# Patient Record
Sex: FEMALE | Race: BLACK OR AFRICAN AMERICAN | NOT HISPANIC OR LATINO | Employment: FULL TIME | URBAN - NONMETROPOLITAN AREA
[De-identification: names, ages, dates, MRNs, and addresses within clinical notes are randomized per-mention and may not be internally consistent; named-entity substitution may affect disease eponyms.]

---

## 2017-11-10 ENCOUNTER — HOSPITAL ENCOUNTER (EMERGENCY)
Facility: HOSPITAL | Age: 27
Discharge: HOME | End: 2017-11-10
Attending: EMERGENCY MEDICINE | Admitting: EMERGENCY MEDICINE

## 2017-11-10 ENCOUNTER — APPOINTMENT (OUTPATIENT)
Dept: GENERAL RADIOLOGY | Facility: HOSPITAL | Age: 27
End: 2017-11-10

## 2017-11-10 VITALS
HEART RATE: 82 BPM | RESPIRATION RATE: 16 BRPM | TEMPERATURE: 97.6 F | WEIGHT: 130 LBS | OXYGEN SATURATION: 100 % | SYSTOLIC BLOOD PRESSURE: 122 MMHG | HEIGHT: 66 IN | BODY MASS INDEX: 20.89 KG/M2 | DIASTOLIC BLOOD PRESSURE: 89 MMHG

## 2017-11-10 DIAGNOSIS — R55 VASOVAGAL SYNCOPE: Primary | ICD-10-CM

## 2017-11-10 LAB
ALBUMIN SERPL-MCNC: 4.6 G/DL (ref 3.5–5)
ALBUMIN/GLOB SERPL: 1.3 G/DL (ref 1–2)
ALP SERPL-CCNC: 80 U/L (ref 38–126)
ALT SERPL W P-5'-P-CCNC: 108 U/L (ref 13–69)
AMPHET+METHAMPHET UR QL: NEGATIVE
AMPHETAMINES UR QL: NEGATIVE
ANION GAP SERPL CALCULATED.3IONS-SCNC: 17.9 MMOL/L
AST SERPL-CCNC: 45 U/L (ref 15–46)
B-HCG UR QL: NEGATIVE
BACTERIA UR QL AUTO: ABNORMAL /HPF
BARBITURATES UR QL SCN: NEGATIVE
BASOPHILS # BLD AUTO: 0.05 10*3/MM3 (ref 0–0.2)
BASOPHILS NFR BLD AUTO: 0.7 % (ref 0–2.5)
BENZODIAZ UR QL SCN: NEGATIVE
BILIRUB SERPL-MCNC: 0.3 MG/DL (ref 0.2–1.3)
BILIRUB UR QL STRIP: NEGATIVE
BUN BLD-MCNC: 12 MG/DL (ref 7–20)
BUN/CREAT SERPL: 17.1 (ref 7.1–23.5)
BUPRENORPHINE SERPL-MCNC: NEGATIVE NG/ML
CALCIUM SPEC-SCNC: 9.6 MG/DL (ref 8.4–10.2)
CANNABINOIDS SERPL QL: NEGATIVE
CHLORIDE SERPL-SCNC: 104 MMOL/L (ref 98–107)
CLARITY UR: CLEAR
CO2 SERPL-SCNC: 28 MMOL/L (ref 26–30)
COCAINE UR QL: NEGATIVE
COLOR UR: YELLOW
CREAT BLD-MCNC: 0.7 MG/DL (ref 0.6–1.3)
D-DIMER, QUANTITATIVE (MAD,POW, STR): 107 NG/ML (FEU) (ref 0–500)
DEPRECATED RDW RBC AUTO: 43.4 FL (ref 37–54)
EOSINOPHIL # BLD AUTO: 0.14 10*3/MM3 (ref 0–0.7)
EOSINOPHIL NFR BLD AUTO: 1.8 % (ref 0–7)
ERYTHROCYTE [DISTWIDTH] IN BLOOD BY AUTOMATED COUNT: 12.1 % (ref 11.5–14.5)
GFR SERPL CREATININE-BSD FRML MDRD: 121 ML/MIN/1.73
GLOBULIN UR ELPH-MCNC: 3.5 GM/DL
GLUCOSE BLD-MCNC: 125 MG/DL (ref 74–98)
GLUCOSE BLDC GLUCOMTR-MCNC: 90 MG/DL (ref 70–130)
GLUCOSE UR STRIP-MCNC: NEGATIVE MG/DL
GRAN CASTS URNS QL MICRO: ABNORMAL /LPF
HCT VFR BLD AUTO: 39.8 % (ref 37–47)
HGB BLD-MCNC: 12.6 G/DL (ref 12–16)
HGB UR QL STRIP.AUTO: NEGATIVE
HYALINE CASTS UR QL AUTO: ABNORMAL /LPF
IMM GRANULOCYTES # BLD: 0.01 10*3/MM3 (ref 0–0.06)
IMM GRANULOCYTES NFR BLD: 0.1 % (ref 0–0.6)
KETONES UR QL STRIP: NEGATIVE
LEUKOCYTE ESTERASE UR QL STRIP.AUTO: NEGATIVE
LYMPHOCYTES # BLD AUTO: 3.5 10*3/MM3 (ref 0.6–3.4)
LYMPHOCYTES NFR BLD AUTO: 45.7 % (ref 10–50)
MCH RBC QN AUTO: 30.7 PG (ref 27–31)
MCHC RBC AUTO-ENTMCNC: 31.7 G/DL (ref 30–37)
MCV RBC AUTO: 97.1 FL (ref 81–99)
METHADONE UR QL SCN: NEGATIVE
MONOCYTES # BLD AUTO: 0.78 10*3/MM3 (ref 0–0.9)
MONOCYTES NFR BLD AUTO: 10.2 % (ref 0–12)
MUCOUS THREADS URNS QL MICRO: ABNORMAL /HPF
NEUTROPHILS # BLD AUTO: 3.18 10*3/MM3 (ref 2–6.9)
NEUTROPHILS NFR BLD AUTO: 41.5 % (ref 37–80)
NITRITE UR QL STRIP: NEGATIVE
NRBC BLD MANUAL-RTO: 0 /100 WBC (ref 0–0)
OPIATES UR QL: NEGATIVE
OXYCODONE UR QL SCN: NEGATIVE
PCP UR QL SCN: NEGATIVE
PH UR STRIP.AUTO: 6.5 [PH] (ref 5–8)
PLATELET # BLD AUTO: 284 10*3/MM3 (ref 130–400)
PMV BLD AUTO: 11.5 FL (ref 6–12)
POTASSIUM BLD-SCNC: 3.9 MMOL/L (ref 3.5–5.1)
PROPOXYPH UR QL: NEGATIVE
PROT SERPL-MCNC: 8.1 G/DL (ref 6.3–8.2)
PROT UR QL STRIP: ABNORMAL
RBC # BLD AUTO: 4.1 10*6/MM3 (ref 4.2–5.4)
RBC # UR: ABNORMAL /HPF
REF LAB TEST METHOD: ABNORMAL
SODIUM BLD-SCNC: 146 MMOL/L (ref 137–145)
SP GR UR STRIP: 1.02 (ref 1–1.03)
SQUAMOUS #/AREA URNS HPF: ABNORMAL /HPF
TRANS CELLS #/AREA URNS HPF: ABNORMAL /HPF
TRICYCLICS UR QL SCN: NEGATIVE
TROPONIN I SERPL-MCNC: <0.012 NG/ML (ref 0–0.03)
UROBILINOGEN UR QL STRIP: ABNORMAL
WBC NRBC COR # BLD: 7.66 10*3/MM3 (ref 4.8–10.8)
WBC UR QL AUTO: ABNORMAL /HPF

## 2017-11-10 PROCEDURE — 71020 HC CHEST PA AND LATERAL: CPT

## 2017-11-10 PROCEDURE — 81025 URINE PREGNANCY TEST: CPT | Performed by: NURSE PRACTITIONER

## 2017-11-10 PROCEDURE — 85379 FIBRIN DEGRADATION QUANT: CPT | Performed by: NURSE PRACTITIONER

## 2017-11-10 PROCEDURE — 81001 URINALYSIS AUTO W/SCOPE: CPT | Performed by: NURSE PRACTITIONER

## 2017-11-10 PROCEDURE — 80306 DRUG TEST PRSMV INSTRMNT: CPT | Performed by: NURSE PRACTITIONER

## 2017-11-10 PROCEDURE — 99284 EMERGENCY DEPT VISIT MOD MDM: CPT

## 2017-11-10 PROCEDURE — 80053 COMPREHEN METABOLIC PANEL: CPT | Performed by: NURSE PRACTITIONER

## 2017-11-10 PROCEDURE — 96361 HYDRATE IV INFUSION ADD-ON: CPT

## 2017-11-10 PROCEDURE — 82962 GLUCOSE BLOOD TEST: CPT

## 2017-11-10 PROCEDURE — 87086 URINE CULTURE/COLONY COUNT: CPT | Performed by: NURSE PRACTITIONER

## 2017-11-10 PROCEDURE — 85025 COMPLETE CBC W/AUTO DIFF WBC: CPT | Performed by: NURSE PRACTITIONER

## 2017-11-10 PROCEDURE — 96360 HYDRATION IV INFUSION INIT: CPT

## 2017-11-10 PROCEDURE — 84484 ASSAY OF TROPONIN QUANT: CPT | Performed by: NURSE PRACTITIONER

## 2017-11-10 PROCEDURE — 93005 ELECTROCARDIOGRAM TRACING: CPT | Performed by: NURSE PRACTITIONER

## 2017-11-10 RX ADMIN — SODIUM CHLORIDE 1000 ML: 9 INJECTION, SOLUTION INTRAVENOUS at 19:55

## 2017-11-12 LAB — BACTERIA SPEC AEROBE CULT: NORMAL

## 2017-11-12 NOTE — ED PROVIDER NOTES
Subjective   History of Present Illness  27-year-old female who is currently incarcerated, was brought to the emergency department for concerns of an overdose.  The patient reports that she was sleeping and got up fast felt very faint and passed out.  She aroused easily.  She denies any chest pain, shortness of breath, PND or orthopnea.  She denies neck pain.  She states she has a mild headache.  He states she's been eating and drinking normally.  She denies any current drug usage.  Review of Systems   All other systems reviewed and are negative.      Past Medical History:   Diagnosis Date   • Ovarian cyst        No Known Allergies    Past Surgical History:   Procedure Laterality Date   •  SECTION         History reviewed. No pertinent family history.    Social History     Social History   • Marital status: Single     Spouse name: N/A   • Number of children: N/A   • Years of education: N/A     Social History Main Topics   • Smoking status: Current Every Day Smoker     Types: Electronic Cigarette, Cigarettes   • Smokeless tobacco: None   • Alcohol use No   • Drug use: Yes     Special: IV   • Sexual activity: Not Asked     Other Topics Concern   • None     Social History Narrative   • None           Objective   Physical Exam   Constitutional: She is oriented to person, place, and time. She appears well-developed and well-nourished.   HENT:   Head: Normocephalic and atraumatic.   Nose: Nose normal.   Mouth/Throat: Oropharynx is clear and moist.   TMs are pearly gray bilaterally.   Eyes: Conjunctivae and EOM are normal. Pupils are equal, round, and reactive to light.   Neck: Normal range of motion. Neck supple.   Cardiovascular: Normal rate, regular rhythm, normal heart sounds and intact distal pulses.  Exam reveals no gallop and no friction rub.    No murmur heard.  Pulmonary/Chest: Effort normal and breath sounds normal.   Abdominal: Soft. Bowel sounds are normal. There is no tenderness.   Musculoskeletal:  Normal range of motion. She exhibits no tenderness.   Neurological: She is alert and oriented to person, place, and time. She has normal reflexes. She displays normal reflexes. No cranial nerve deficit. She exhibits normal muscle tone. Coordination normal.   Skin: Skin is warm and dry.   Psychiatric: She has a normal mood and affect. Her behavior is normal. Judgment and thought content normal.   Nursing note and vitals reviewed.      Procedures         ED Course  ED Course   Comment By Time   EKG demonstrated normal sinus rhythm no acute changes.  Urine drug screen is negative.  UA is negative.  CMP and CBC are normal.  Prominent and d-dimer are both negative.  Chest x-ray was negative.  CT of the head was negative.  We gave her normal saline 1 L here in the ER and she states that she feels fine.  She will be discharged back to the nursing home and if symptoms worsen or return they will bring her back to the emergency department. Sushma Fajardo, GABI 11/12 2625                  [facility]    Final diagnoses:   Vasovagal syncope            Sushma Fajardo, GABI  11/12/17 [id number]

## 2021-07-22 ENCOUNTER — TRANSCRIBE ORDERS (OUTPATIENT)
Dept: ADMINISTRATIVE | Facility: HOSPITAL | Age: 31
End: 2021-07-22

## 2021-07-22 DIAGNOSIS — B17.10 ACUTE HEPATITIS C WITHOUT HEPATIC COMA: ICD-10-CM

## 2021-07-22 DIAGNOSIS — B00.9 HERPES SIMPLEX VIRAL INFECTION: Primary | ICD-10-CM

## 2021-08-10 ENCOUNTER — HOSPITAL ENCOUNTER (OUTPATIENT)
Dept: ULTRASOUND IMAGING | Facility: HOSPITAL | Age: 31
Discharge: HOME | End: 2021-08-10
Admitting: NURSE PRACTITIONER

## 2021-08-10 ENCOUNTER — OFFICE VISIT (OUTPATIENT)
Dept: OBSTETRICS AND GYNECOLOGY | Facility: CLINIC | Age: 31
End: 2021-08-10

## 2021-08-10 VITALS
BODY MASS INDEX: 21.53 KG/M2 | HEIGHT: 66 IN | SYSTOLIC BLOOD PRESSURE: 120 MMHG | DIASTOLIC BLOOD PRESSURE: 72 MMHG | WEIGHT: 134 LBS

## 2021-08-10 DIAGNOSIS — Z12.4 SCREENING FOR CERVICAL CANCER: ICD-10-CM

## 2021-08-10 DIAGNOSIS — B00.9 HERPES SIMPLEX VIRAL INFECTION: ICD-10-CM

## 2021-08-10 DIAGNOSIS — Z01.419 ENCOUNTER FOR GYNECOLOGICAL EXAMINATION WITHOUT ABNORMAL FINDING: Primary | ICD-10-CM

## 2021-08-10 DIAGNOSIS — Z11.3 ROUTINE SCREENING FOR STI (SEXUALLY TRANSMITTED INFECTION): ICD-10-CM

## 2021-08-10 DIAGNOSIS — B17.10 ACUTE HEPATITIS C WITHOUT HEPATIC COMA: ICD-10-CM

## 2021-08-10 PROCEDURE — 76705 ECHO EXAM OF ABDOMEN: CPT

## 2021-08-10 PROCEDURE — 99385 PREV VISIT NEW AGE 18-39: CPT | Performed by: PHYSICIAN ASSISTANT

## 2021-08-10 NOTE — PROGRESS NOTES
"Subjective   Chief Complaint   Patient presents with   • Gynecologic Exam     Annual and pap. Last pap 2019 WNL. Patient states she is doing well       Juvencio Ramirez is a 31 y.o. year old  presenting to be seen for her annual gynecological exam.   Has no complaints or concerns  Using condoms for birth control  Would like STI screening with pap but no symptoms of STI  Cycles regular q 28 days and LMP 2 weeks ago    Past Medical History:   Diagnosis Date   • Hepatitis C    • Herpes    • Infectious viral hepatitis    • Ovarian cyst       No current outpatient medications on file.   No Known Allergies   Past Surgical History:   Procedure Laterality Date   •  SECTION        Social History     Socioeconomic History   • Marital status: Single     Spouse name: Not on file   • Number of children: Not on file   • Years of education: Not on file   • Highest education level: Not on file   Tobacco Use   • Smoking status: Current Every Day Smoker     Types: Electronic Cigarette, Cigarettes   • Smokeless tobacco: Never Used   Substance and Sexual Activity   • Alcohol use: No   • Drug use: Yes     Types: IV   • Sexual activity: Yes     Partners: Male      Family History   Problem Relation Age of Onset   • Diabetes Mother        Review of Systems   Constitutional: Negative for chills, diaphoresis and fever.   Gastrointestinal: Positive for constipation. Negative for abdominal pain.   Genitourinary: Negative for difficulty urinating, dysuria, menstrual problem, pelvic pain and vaginal discharge.   All other systems reviewed and are negative.          Objective   /72   Ht 167.6 cm (66\")   Wt 60.8 kg (134 lb)   LMP 2021   Breastfeeding No   BMI 21.63 kg/m²     Physical Exam  Constitutional:       Appearance: Normal appearance. She is well-developed and well-groomed.   Eyes:      General: Lids are normal.      Extraocular Movements: Extraocular movements intact.      Conjunctiva/sclera: Conjunctivae " normal.   Neck:      Thyroid: No thyroid mass or thyromegaly.   Chest:      Breasts: Breasts are symmetrical.         Right: No inverted nipple, mass, nipple discharge, skin change or tenderness.         Left: No inverted nipple, mass, nipple discharge, skin change or tenderness.   Abdominal:      General: There is no distension.      Palpations: Abdomen is soft. There is no hepatomegaly or splenomegaly.      Tenderness: There is no abdominal tenderness.   Genitourinary:     Exam position: Lithotomy position.      Labia:         Right: No rash, tenderness or lesion.         Left: No rash, tenderness or lesion.       Urethra: No prolapse, urethral pain, urethral swelling or urethral lesion.      Vagina: No vaginal discharge, tenderness or lesions.      Cervix: No cervical motion tenderness, discharge, friability or lesion.      Uterus: Not enlarged and not tender.       Adnexa:         Right: No mass or tenderness.          Left: No mass or tenderness.     Musculoskeletal:      Cervical back: Neck supple.   Lymphadenopathy:      Upper Body:      Right upper body: No axillary adenopathy.      Left upper body: No axillary adenopathy.   Skin:     General: Skin is warm and dry.      Findings: No lesion.   Neurological:      General: No focal deficit present.      Mental Status: She is alert and oriented to person, place, and time.   Psychiatric:         Attention and Perception: Attention normal.         Mood and Affect: Mood normal.         Speech: Speech normal.         Behavior: Behavior normal.         Thought Content: Thought content normal.            Result Review :                   Assessment and Plan  Diagnoses and all orders for this visit:    1. Encounter for gynecological examination without abnormal finding (Primary)    2. Screening for cervical cancer  -     Pap IG, Ct-Ng, HPV-hr; Future    3. Routine screening for STI (sexually transmitted infection)  -     Pap IG, Ct-Ng, HPV-hr; Future      Patient  Instructions   Self breast exam monthly  Regular exercise                This note was electronically signed.    Velma Allison PA-C   August 10, 2021

## 2021-08-20 ENCOUNTER — DISEASE STATE MANAGEMENT VISIT (OUTPATIENT)
Dept: PHARMACY | Facility: HOSPITAL | Age: 31
End: 2021-08-20

## 2021-08-20 ENCOUNTER — LAB (OUTPATIENT)
Dept: LAB | Facility: HOSPITAL | Age: 31
End: 2021-08-20

## 2021-08-20 VITALS
SYSTOLIC BLOOD PRESSURE: 124 MMHG | OXYGEN SATURATION: 96 % | HEART RATE: 102 BPM | TEMPERATURE: 98.2 F | DIASTOLIC BLOOD PRESSURE: 67 MMHG

## 2021-08-20 DIAGNOSIS — Z11.3 ROUTINE SCREENING FOR STI (SEXUALLY TRANSMITTED INFECTION): ICD-10-CM

## 2021-08-20 DIAGNOSIS — Z12.4 SCREENING FOR CERVICAL CANCER: ICD-10-CM

## 2021-08-20 DIAGNOSIS — F19.91 HISTORY OF ILLICIT DRUG USE: ICD-10-CM

## 2021-08-20 DIAGNOSIS — B18.2 CHRONIC HEPATITIS C WITHOUT HEPATIC COMA (HCC): Primary | ICD-10-CM

## 2021-08-20 DIAGNOSIS — B18.2 CHRONIC HEPATITIS C WITHOUT HEPATIC COMA (HCC): ICD-10-CM

## 2021-08-20 LAB
ALBUMIN SERPL-MCNC: 4.3 G/DL (ref 3.5–5.2)
ALBUMIN/GLOB SERPL: 1.3 G/DL
ALP SERPL-CCNC: 56 U/L (ref 39–117)
ALT SERPL W P-5'-P-CCNC: 24 U/L (ref 1–33)
AMPHET+METHAMPHET UR QL: NEGATIVE
AMPHETAMINES UR QL: NEGATIVE
ANION GAP SERPL CALCULATED.3IONS-SCNC: 8.9 MMOL/L (ref 5–15)
AST SERPL-CCNC: 19 U/L (ref 1–32)
BARBITURATES UR QL SCN: NEGATIVE
BENZODIAZ UR QL SCN: NEGATIVE
BILIRUB SERPL-MCNC: 0.4 MG/DL (ref 0–1.2)
BUN SERPL-MCNC: 12 MG/DL (ref 6–20)
BUN/CREAT SERPL: 18.5 (ref 7–25)
BUPRENORPHINE SERPL-MCNC: NEGATIVE NG/ML
CALCIUM SPEC-SCNC: 9.3 MG/DL (ref 8.6–10.5)
CANNABINOIDS SERPL QL: NEGATIVE
CHLORIDE SERPL-SCNC: 107 MMOL/L (ref 98–107)
CO2 SERPL-SCNC: 25.1 MMOL/L (ref 22–29)
COCAINE UR QL: NEGATIVE
CREAT SERPL-MCNC: 0.65 MG/DL (ref 0.57–1)
GFR SERPL CREATININE-BSD FRML MDRD: 128 ML/MIN/1.73
GLOBULIN UR ELPH-MCNC: 3.3 GM/DL
GLUCOSE SERPL-MCNC: 84 MG/DL (ref 65–99)
HBV SURFACE AB SER RIA-ACNC: REACTIVE
HCG SERPL QL: NEGATIVE
INR PPP: 0.98 (ref 0.9–1.1)
METHADONE UR QL SCN: NEGATIVE
OPIATES UR QL: NEGATIVE
OXYCODONE UR QL SCN: NEGATIVE
PCP UR QL SCN: NEGATIVE
POTASSIUM SERPL-SCNC: 4 MMOL/L (ref 3.5–5.2)
PROPOXYPH UR QL: NEGATIVE
PROT SERPL-MCNC: 7.6 G/DL (ref 6–8.5)
PROTHROMBIN TIME: 13.5 SECONDS (ref 12–15.1)
SODIUM SERPL-SCNC: 141 MMOL/L (ref 136–145)
TRICYCLICS UR QL SCN: NEGATIVE

## 2021-08-20 PROCEDURE — 86704 HEP B CORE ANTIBODY TOTAL: CPT

## 2021-08-20 PROCEDURE — 36415 COLL VENOUS BLD VENIPUNCTURE: CPT

## 2021-08-20 PROCEDURE — 87902 NFCT AGT GNTYP ALYS HEP C: CPT

## 2021-08-20 PROCEDURE — 85610 PROTHROMBIN TIME: CPT

## 2021-08-20 PROCEDURE — 81596 NFCT DS CHRNC HCV 6 ASSAYS: CPT

## 2021-08-20 PROCEDURE — 87522 HEPATITIS C REVRS TRNSCRPJ: CPT

## 2021-08-20 PROCEDURE — 80053 COMPREHEN METABOLIC PANEL: CPT

## 2021-08-20 PROCEDURE — 99203 OFFICE O/P NEW LOW 30 MIN: CPT | Performed by: PHYSICIAN ASSISTANT

## 2021-08-20 PROCEDURE — 86708 HEPATITIS A ANTIBODY: CPT

## 2021-08-20 PROCEDURE — 84703 CHORIONIC GONADOTROPIN ASSAY: CPT

## 2021-08-20 PROCEDURE — 80306 DRUG TEST PRSMV INSTRMNT: CPT

## 2021-08-20 PROCEDURE — 86706 HEP B SURFACE ANTIBODY: CPT

## 2021-08-20 RX ORDER — NORETHINDRONE AND ETHINYL ESTRADIOL 1 MG-35MCG
1 KIT ORAL DAILY
COMMUNITY
End: 2021-10-08

## 2021-08-20 RX ORDER — LEVOCETIRIZINE DIHYDROCHLORIDE 5 MG/1
5 TABLET, FILM COATED ORAL DAILY
COMMUNITY
Start: 2021-08-19 | End: 2021-10-08

## 2021-08-20 NOTE — PROGRESS NOTES
New Patient Consult      Date: 2021   Patient Name: Juvencio Ramirez  MRN: [id number]  : 1990     Primary Care Provider: Teresa Madrid APRN  Referring Provider: Brando    Chief Complaint   Patient presents with   • Hepatitis C     Pt here for initial Hep C visit     History of Present Illness: Juvencio Ramirez is a 31 y.o. female who is here today as a consultation with Gastroenterology for evaluation of Hepatitis C.    She found out about having Hepatitis C infection approx 2 years ago. She has not had prior treatment for hepatitis. She found out that she had it during incarceration. Reports no known personal history of liver disease including other viral hepatitis. There is no known family history of liver disease or cirrhosis. She admits to previous IVDU and intranasal drug use. She has been clean now for 5 years. She does not have nonprofessional tattoos. Denies having previous alcoholism. She does currently drink alcohol socially in small amounts. She denies  current illicit drug use including marijuana. She did have recent liver imaging. She has had recent labs. She has had previous vaccinations for Hepatitis A (she thinks).     Subjective      Past Medical History:   Diagnosis Date   • Hepatitis C    • Herpes    • Infectious viral hepatitis    • Ovarian cyst      Past Surgical History:   Procedure Laterality Date   •  SECTION       Family History   Problem Relation Age of Onset   • Diabetes Mother    • Thyroid disease Mother      Social History     Socioeconomic History   • Marital status: Single     Spouse name: Not on file   • Number of children: Not on file   • Years of education: Not on file   • Highest education level: Not on file   Tobacco Use   • Smoking status: Current Every Day Smoker     Packs/day: 0.50     Types: Cigarettes   • Smokeless tobacco: Never Used   Vaping Use   • Vaping Use: Former   Substance and Sexual Activity   • Alcohol use: No   • Drug use: Not  Currently     Types: IV   • Sexual activity: Yes     Partners: Male     Current Outpatient Medications:   •  levocetirizine (XYZAL) 5 MG tablet, Take 5 mg by mouth Daily., Disp: , Rfl:   •  norethindrone-ethinyl estradiol (Alyacen 1/35) 1-35 MG-MCG per tablet, Take 1 tablet by mouth Daily., Disp: , Rfl:   •  VITAMIN D PO, Take 1 tablet by mouth Daily., Disp: , Rfl:     No Known Allergies    The following portions of the patient's history were reviewed and updated as appropriate: allergies, current medications, past family history, past medical history, past social history, past surgical history and problem list.    Objective     Physical Exam  Vitals reviewed.   Constitutional:       General: She is not in acute distress.     Appearance: Normal appearance. She is well-developed. She is not ill-appearing or diaphoretic.   HENT:      Head: Normocephalic and atraumatic.      Right Ear: External ear normal.      Left Ear: External ear normal.      Nose: Nose normal.      Mouth/Throat:      Comments: Wearing a mask  Eyes:      General: No scleral icterus.        Right eye: No discharge.         Left eye: No discharge.      Conjunctiva/sclera: Conjunctivae normal.   Neck:      Vascular: No JVD.   Cardiovascular:      Rate and Rhythm: Normal rate and regular rhythm.      Heart sounds: Normal heart sounds. No murmur heard.   No friction rub. No gallop.    Pulmonary:      Effort: Pulmonary effort is normal. No respiratory distress.      Breath sounds: Normal breath sounds. No wheezing or rales.   Chest:      Chest wall: No tenderness.   Abdominal:      General: Bowel sounds are normal. There is no distension.      Palpations: Abdomen is soft. There is no mass.      Tenderness: There is no abdominal tenderness. There is no guarding.   Musculoskeletal:         General: No deformity. Normal range of motion.      Cervical back: Normal range of motion.   Skin:     General: Skin is warm and dry.      Findings: No erythema or rash.       Comments: tattophylicia   Neurological:      Mental Status: She is alert and oriented to person, place, and time.      Coordination: Coordination normal.   Psychiatric:         Mood and Affect: Mood normal.         Behavior: Behavior normal.         Thought Content: Thought content normal.         Judgment: Judgment normal.       Vitals:    08/20/21 1100   BP: 124/67   Pulse: 102   Temp: 98.2 °F (36.8 °C)   SpO2: 96%     Results Review:   I have reviewed the patient's new clinical and imaging results.    Labs 7/10/2021: HIV negative, AST 20, ALT 29, alkaline phosphatase 59, total bilirubin 0.4, hepatitis B surface antigen negative, hepatitis B core antibody IgM negative, hepatitis C antibody positive, HCVRNA 36,200.     US Liver  Result Date: 8/10/2021  Normal liver ultrasound.       Assessment / Plan      1. Chronic hepatitis C without hepatic coma   08/20/21   She has chronic hepatitis C, genotype unknown, treatment naïve, without suspected cirrhosis.  She will need labs prior to treatment including repeat CMP, pregnancy screening, fibrosis staging, repeat HCVRNA quant, labs to determine immunity hepatitis A and B, hepatitis B core total antibody, INR.    More recommendations will be made after these results have been reviewed. She will continue to abstain from alcohol and illegal drugs. She will have US liver to determine if any lesions or other liver diseases present. Immunity to Hep A and B will be determined and vaccinations recommended if needed. After review of these results and discussion with with the patient, we will proceed with Hep C therapy and will submit Rx to insurance company for approval. Treatment will depend on fibrosis score and Hep C genotype. When approved, she will  Rx from our pharmacy and start taking exactly as directed. Hep C viral load lab (HCV RNA quant) and CMP will be checked 4 weeks after start of therapy, at end of therapy and 3 months s/p therapy to determine response to  treatment and cure. She will call with concerns.     - Comprehensive Metabolic Panel; Future  - hCG, Serum, Qualitative; Future  - HCV FibroSURE; Future  - HCV RNA By PCR, Qn Rfx Susan; Future  - Hepatitis A Antibody, Total; Future  - Hepatitis B Core Antibody, Total; Future  - Hepatitis B Surface Antibody; Future  - Protime-INR; Future    2. History of illicit drug use  08/20/21  She has a prior history of IV drug use, has been clean for 5 years.  Will complete urine drug screen today prior to starting any treatment.  She understands that even after hepatitis C treatment, she is not immune to hepatitis C and can contract again if she continues any risky behaviors.  - Urine Drug Screen - Urine, Clean Catch; Future          Follow Up:   She will follow up after 4 weeks of taking hepatitis C medication.  She will be called with those results.  Her work schedule won't allow for her to follow-up in 2 weeks.      Jillian Robison PA-C  [facility] [place]  8/20/2021  14:37 EDT    Please note that portions of this note may have been completed with a voice recognition program. Efforts were made to edit the dictations, but occasionally words are mistranscribed.

## 2021-08-21 LAB
HAV AB SER QL IA: POSITIVE
HBV CORE AB SERPL QL IA: NEGATIVE

## 2021-08-22 LAB
HCV GENTYP SERPL NAA+PROBE: NORMAL
HCV GENTYP SERPL NAA+PROBE: NORMAL
HCV RNA SERPL NAA+PROBE-ACNC: NORMAL IU/ML
HCV RNA SERPL NAA+PROBE-LOG IU: 4.78 LOG10 IU/ML
LABORATORY COMMENT REPORT: NORMAL
REF LAB TEST REF RANGE: NORMAL

## 2021-08-24 LAB
A2 MACROGLOB SERPL-MCNC: 170 MG/DL (ref 110–276)
ALT SERPL W P-5'-P-CCNC: 22 IU/L (ref 0–40)
APO A-I SERPL-MCNC: 154 MG/DL (ref 116–209)
BILIRUB SERPL-MCNC: 0.4 MG/DL (ref 0–1.2)
FIBROSIS SCORING:: NORMAL
FIBROSIS STAGE SERPL QL: NORMAL
GGT SERPL-CCNC: 12 IU/L (ref 0–60)
HAPTOGLOB SERPL-MCNC: 81 MG/DL (ref 33–278)
HCV AB SER QL: NORMAL
LABORATORY COMMENT REPORT: NORMAL
LIVER FIBR SCORE SERPL CALC.FIBROSURE: 0.06 (ref 0–0.21)
NECROINFLAMM ACTIVITY SCORING:: NORMAL
NECROINFLAMMATORY ACT GRADE SERPL QL: NORMAL
NECROINFLAMMATORY ACT SCORE SERPL: 0.07 (ref 0–0.17)
SERVICE CMNT-IMP: NORMAL

## 2021-08-26 ENCOUNTER — TELEPHONE (OUTPATIENT)
Dept: GASTROENTEROLOGY | Facility: CLINIC | Age: 31
End: 2021-08-26

## 2021-08-26 DIAGNOSIS — B18.2 CHRONIC HEPATITIS C WITHOUT HEPATIC COMA (HCC): Primary | ICD-10-CM

## 2021-08-26 RX ORDER — VELPATASVIR AND SOFOSBUVIR 100; 400 MG/1; MG/1
1 TABLET, FILM COATED ORAL DAILY
Qty: 28 TABLET | Refills: 2
Start: 2021-08-26 | End: 2021-08-27

## 2021-08-26 RX ORDER — GLECAPREVIR AND PIBRENTASVIR 40; 100 MG/1; MG/1
3 TABLET, FILM COATED ORAL DAILY
Qty: 84 TABLET | Refills: 1
Start: 2021-08-26 | End: 2021-08-26

## 2021-08-26 NOTE — TELEPHONE ENCOUNTER
She has chronic Hepatitis C infection, genotype 1a, treatment naive, without cirrhosis (F0). I have prescribed Epclusa for 12 weeks. She will not need to change her birth control with this therapy.

## 2021-08-27 ENCOUNTER — DISEASE STATE MANAGEMENT VISIT (OUTPATIENT)
Dept: PHARMACY | Facility: HOSPITAL | Age: 31
End: 2021-08-27

## 2021-08-27 RX ORDER — VELPATASVIR AND SOFOSBUVIR 100; 400 MG/1; MG/1
1 TABLET, FILM COATED ORAL DAILY
Qty: 28 TABLET | Refills: 2 | Status: SHIPPED | OUTPATIENT
Start: 2021-08-27 | End: 2021-10-01

## 2021-08-27 NOTE — PROGRESS NOTES
Juvencio Ramirez is a 31 y.o. female [facility] for Hepatitis C treatment.      Past Medical History:   Diagnosis Date   • Hepatitis C    • Herpes    • Infectious viral hepatitis    • Ovarian cyst      Social History     Socioeconomic History   • Marital status: Single     Spouse name: Not on file   • Number of children: Not on file   • Years of education: Not on file   • Highest education level: Not on file   Tobacco Use   • Smoking status: Current Every Day Smoker     Packs/day: 0.50     Types: Cigarettes   • Smokeless tobacco: Never Used   Vaping Use   • Vaping Use: Former   Substance and Sexual Activity   • Alcohol use: No   • Drug use: Not Currently     Types: IV   • Sexual activity: Yes     Partners: Male     Patient has no known allergies.    Current Outpatient Medications:   •  levocetirizine (XYZAL) 5 MG tablet, Take 5 mg by mouth Daily., Disp: , Rfl:   •  norethindrone-ethinyl estradiol (Alyacen 1/35) 1-35 MG-MCG per tablet, Take 1 tablet by mouth Daily., Disp: , Rfl:   •  Sofosbuvir-Velpatasvir 400-100 MG tablet, Take 1 tablet by mouth Daily., Disp: 28 tablet, Rfl: 2  •  VITAMIN D PO, Take 1 tablet by mouth Daily., Disp: , Rfl:     Labs     WBC   Date Value Ref Range Status   11/10/2017 7.66 4.80 - 10.80 10*3/mm3 Final     RBC   Date Value Ref Range Status   11/10/2017 4.10 (L) 4.20 - 5.40 10*6/mm3 Final     Hemoglobin   Date Value Ref Range Status   11/10/2017 12.6 12.0 - 16.0 g/dL Final     Hematocrit   Date Value Ref Range Status   11/10/2017 39.8 37.0 - 47.0 % Final     MCV   Date Value Ref Range Status   11/10/2017 97.1 81.0 - 99.0 fL Final     MCH   Date Value Ref Range Status   11/10/2017 30.7 27.0 - 31.0 pg Final     MCHC   Date Value Ref Range Status   11/10/2017 31.7 30.0 - 37.0 g/dL Final     RDW   Date Value Ref Range Status   11/10/2017 12.1 11.5 - 14.5 % Final     RDW-SD   Date Value Ref Range Status   11/10/2017 43.4 37.0 - 54.0 fl Final     MPV   Date Value  Ref Range Status   11/10/2017 11.5 6.0 - 12.0 fL Final     Platelets   Date Value Ref Range Status   11/10/2017 284 130 - 400 10*3/mm3 Final     Neutrophil %   Date Value Ref Range Status   11/10/2017 41.5 37.0 - 80.0 % Final     Lymphocyte %   Date Value Ref Range Status   11/10/2017 45.7 10.0 - 50.0 % Final     Monocyte %   Date Value Ref Range Status   11/10/2017 10.2 0.0 - 12.0 % Final     Eosinophil %   Date Value Ref Range Status   11/10/2017 1.8 0.0 - 7.0 % Final     Basophil %   Date Value Ref Range Status   11/10/2017 0.7 0.0 - 2.5 % Final     Immature Grans %   Date Value Ref Range Status   11/10/2017 0.1 0.0 - 0.6 % Final     Neutrophils, Absolute   Date Value Ref Range Status   11/10/2017 3.18 2.00 - 6.90 10*3/mm3 Final     Lymphocytes, Absolute   Date Value Ref Range Status   11/10/2017 3.50 (H) 0.60 - 3.40 10*3/mm3 Final     Monocytes, Absolute   Date Value Ref Range Status   11/10/2017 0.78 0.00 - 0.90 10*3/mm3 Final     Eosinophils, Absolute   Date Value Ref Range Status   11/10/2017 0.14 0.00 - 0.70 10*3/mm3 Final     Basophils, Absolute   Date Value Ref Range Status   11/10/2017 0.05 0.00 - 0.20 10*3/mm3 Final     Immature Grans, Absolute   Date Value Ref Range Status   11/10/2017 0.01 0.00 - 0.06 10*3/mm3 Final     nRBC   Date Value Ref Range Status   11/10/2017 0.0 0.0 - 0.0 /100 WBC Final       Lab Results   Component Value Date    WBC 7.66 11/10/2017    HGB 12.6 11/10/2017    HCT 39.8 11/10/2017    MCV 97.1 11/10/2017     11/10/2017     Lab Results   Component Value Date    HAV Positive (A) 08/20/2021    HEPBCAB Negative 08/20/2021       No results found for: HCVRNA   Hepatitis C Genotype   Date Value Ref Range Status   08/20/2021 1a  Final     HCV Genotype   Date Value Ref Range Status   08/20/2021 Comment  Final     Comment:     To be performed on this specimen.       Drug Interactions Screening     A drug-drug interactions check was made. No interactions expected according to  literature.     Assessment & Plan     Patient has Hepatitis C, genotype 1a, without cirrhosis (F0) and is treatment naïve.  Patient has been prescribed Epclusa 1 tablet daily x 12 weeks.  Will begin prior authorization, if applicable and provide medication counseling to patient once drug is approved and ready to dispense.      Pt will follow up with clinic 4 weeks after starting medication to assess virologic response and medication tolerability.     Payal Mccrary RPH  08/27/21  15:24 EDT

## 2021-08-30 ENCOUNTER — PRIOR AUTHORIZATION (OUTPATIENT)
Dept: PHARMACY | Facility: HOSPITAL | Age: 31
End: 2021-08-30

## 2021-09-01 ENCOUNTER — TELEPHONE (OUTPATIENT)
Dept: PHARMACY | Facility: HOSPITAL | Age: 31
End: 2021-09-01

## 2021-09-09 ENCOUNTER — LAB (OUTPATIENT)
Dept: LAB | Facility: HOSPITAL | Age: 31
End: 2021-09-09

## 2021-09-09 ENCOUNTER — TELEPHONE (OUTPATIENT)
Dept: GASTROENTEROLOGY | Facility: CLINIC | Age: 31
End: 2021-09-09

## 2021-09-09 DIAGNOSIS — R10.11 RUQ ABDOMINAL PAIN: Primary | ICD-10-CM

## 2021-09-09 DIAGNOSIS — R10.11 RUQ ABDOMINAL PAIN: ICD-10-CM

## 2021-09-09 LAB
ALBUMIN SERPL-MCNC: 4.4 G/DL (ref 3.5–5.2)
ALBUMIN/GLOB SERPL: 1.4 G/DL
ALP SERPL-CCNC: 57 U/L (ref 39–117)
ALT SERPL W P-5'-P-CCNC: 12 U/L (ref 1–33)
ANION GAP SERPL CALCULATED.3IONS-SCNC: 12.7 MMOL/L (ref 5–15)
AST SERPL-CCNC: 14 U/L (ref 1–32)
BASOPHILS # BLD AUTO: 0.05 10*3/MM3 (ref 0–0.2)
BASOPHILS NFR BLD AUTO: 0.7 % (ref 0–1.5)
BILIRUB SERPL-MCNC: 0.3 MG/DL (ref 0–1.2)
BUN SERPL-MCNC: 11 MG/DL (ref 6–20)
BUN/CREAT SERPL: 34.4 (ref 7–25)
CALCIUM SPEC-SCNC: 9.5 MG/DL (ref 8.6–10.5)
CHLORIDE SERPL-SCNC: 104 MMOL/L (ref 98–107)
CO2 SERPL-SCNC: 23.3 MMOL/L (ref 22–29)
CREAT SERPL-MCNC: 0.32 MG/DL (ref 0.57–1)
DEPRECATED RDW RBC AUTO: 43.9 FL (ref 37–54)
EOSINOPHIL # BLD AUTO: 0.13 10*3/MM3 (ref 0–0.4)
EOSINOPHIL NFR BLD AUTO: 1.8 % (ref 0.3–6.2)
ERYTHROCYTE [DISTWIDTH] IN BLOOD BY AUTOMATED COUNT: 11.9 % (ref 12.3–15.4)
GFR SERPL CREATININE-BSD FRML MDRD: >150 ML/MIN/1.73
GLOBULIN UR ELPH-MCNC: 3.2 GM/DL
GLUCOSE SERPL-MCNC: 91 MG/DL (ref 65–99)
HCT VFR BLD AUTO: 41.1 % (ref 34–46.6)
HGB BLD-MCNC: 13.5 G/DL (ref 12–15.9)
IMM GRANULOCYTES # BLD AUTO: 0.02 10*3/MM3 (ref 0–0.05)
IMM GRANULOCYTES NFR BLD AUTO: 0.3 % (ref 0–0.5)
LYMPHOCYTES # BLD AUTO: 2.4 10*3/MM3 (ref 0.7–3.1)
LYMPHOCYTES NFR BLD AUTO: 33.1 % (ref 19.6–45.3)
MCH RBC QN AUTO: 32.7 PG (ref 26.6–33)
MCHC RBC AUTO-ENTMCNC: 32.8 G/DL (ref 31.5–35.7)
MCV RBC AUTO: 99.5 FL (ref 79–97)
MONOCYTES # BLD AUTO: 0.67 10*3/MM3 (ref 0.1–0.9)
MONOCYTES NFR BLD AUTO: 9.2 % (ref 5–12)
NEUTROPHILS NFR BLD AUTO: 3.98 10*3/MM3 (ref 1.7–7)
NEUTROPHILS NFR BLD AUTO: 54.9 % (ref 42.7–76)
NRBC BLD AUTO-RTO: 0 /100 WBC (ref 0–0.2)
PLATELET # BLD AUTO: 293 10*3/MM3 (ref 140–450)
PMV BLD AUTO: 12.5 FL (ref 6–12)
POTASSIUM SERPL-SCNC: 3.7 MMOL/L (ref 3.5–5.2)
PROT SERPL-MCNC: 7.6 G/DL (ref 6–8.5)
RBC # BLD AUTO: 4.13 10*6/MM3 (ref 3.77–5.28)
SODIUM SERPL-SCNC: 140 MMOL/L (ref 136–145)
WBC # BLD AUTO: 7.25 10*3/MM3 (ref 3.4–10.8)

## 2021-09-09 PROCEDURE — 36415 COLL VENOUS BLD VENIPUNCTURE: CPT

## 2021-09-09 PROCEDURE — 85025 COMPLETE CBC W/AUTO DIFF WBC: CPT

## 2021-09-09 PROCEDURE — 80053 COMPREHEN METABOLIC PANEL: CPT

## 2021-09-09 NOTE — TELEPHONE ENCOUNTER
Pancho Macedo MA Chasteen, Sara, PA-C  Pt does have some constipation, last BM was 2 days ago. Pt states her BMs are normally pretty regular.   Pt is going to have labs drawn now.           Previous Messages       ----- Message -----   From: Jillian Robison PA-C   Sent: 9/9/2021  12:39 PM EDT   To: Pancho Macedo MA     I have ordered labs for her if she will complete those, report to ED with any severe or worsening abdominal pain. Please ask her about bowel movements, does she have constipation?   ----- Message -----   From: Pancho Macedo MA   Sent: 9/9/2021  12:30 PM EDT   To: Jillian Robison PA-C     Pt called this morning complaining of having some right sided abdominal pain, she said it felt swollen and she has had a stomach ache. Pt started on Epclusa on 9/1.     Pt does still have her appendix and has been advised to contact her primary care doctor as well. Is there anything else she needs to do. She asked about getting her liver enzymes checked to rule that out.     Please advise.

## 2021-09-10 NOTE — TELEPHONE ENCOUNTER
CBC and CMP normal. Please let her know. Recommend she take miralax 17 g once daily for treatment of constipation.

## 2021-09-10 NOTE — TELEPHONE ENCOUNTER
Spoke with patient and gave lab results, advised pt to take Miralax 17g daily for constipation.     Pt verbalized understanding

## 2021-09-19 ENCOUNTER — HOSPITAL ENCOUNTER (EMERGENCY)
Facility: HOSPITAL | Age: 31
Discharge: HOME | End: 2021-09-19
Attending: EMERGENCY MEDICINE | Admitting: EMERGENCY MEDICINE

## 2021-09-19 VITALS
HEIGHT: 66 IN | HEART RATE: 112 BPM | DIASTOLIC BLOOD PRESSURE: 87 MMHG | RESPIRATION RATE: 20 BRPM | SYSTOLIC BLOOD PRESSURE: 133 MMHG | OXYGEN SATURATION: 100 % | BODY MASS INDEX: 20.89 KG/M2 | WEIGHT: 130 LBS | TEMPERATURE: 98.2 F

## 2021-09-19 DIAGNOSIS — J06.9 UPPER RESPIRATORY TRACT INFECTION, UNSPECIFIED TYPE: Primary | ICD-10-CM

## 2021-09-19 LAB
S PYO AG THROAT QL: NEGATIVE
SARS-COV-2 RNA PNL SPEC NAA+PROBE: NOT DETECTED

## 2021-09-19 PROCEDURE — 87880 STREP A ASSAY W/OPTIC: CPT | Performed by: PHYSICIAN ASSISTANT

## 2021-09-19 PROCEDURE — 99283 EMERGENCY DEPT VISIT LOW MDM: CPT

## 2021-09-19 PROCEDURE — C9803 HOPD COVID-19 SPEC COLLECT: HCPCS

## 2021-09-19 PROCEDURE — 87635 SARS-COV-2 COVID-19 AMP PRB: CPT | Performed by: PHYSICIAN ASSISTANT

## 2021-09-19 PROCEDURE — 87081 CULTURE SCREEN ONLY: CPT | Performed by: PHYSICIAN ASSISTANT

## 2021-09-19 RX ORDER — AMOXICILLIN AND CLAVULANATE POTASSIUM 875; 125 MG/1; MG/1
1 TABLET, FILM COATED ORAL 2 TIMES DAILY
Qty: 20 TABLET | Refills: 0 | Status: SHIPPED | OUTPATIENT
Start: 2021-09-19 | End: 2022-07-01

## 2021-09-19 NOTE — ED PROVIDER NOTES
Subjective     History provided by:  Patient  URI  Presenting symptoms: congestion, cough, fatigue, rhinorrhea and sore throat    Presenting symptoms: no fever    Severity:  Mild  Onset quality:  Gradual  Duration:  4 days  Timing:  Constant  Progression:  Worsening  Chronicity:  New  Relieved by:  Nothing      Review of Systems   Constitutional: Positive for fatigue. Negative for fever.   HENT: Positive for congestion, rhinorrhea and sore throat.    Respiratory: Positive for cough.    Cardiovascular: Negative.  Negative for chest pain.   Gastrointestinal: Negative.  Negative for abdominal pain.   Endocrine: Negative.    Genitourinary: Negative.  Negative for dysuria.   Skin: Negative.    Neurological: Negative.    Psychiatric/Behavioral: Negative.    All other systems reviewed and are negative.      Past Medical History:   Diagnosis Date   • Hepatitis C    • Herpes    • Infectious viral hepatitis    • Ovarian cyst        No Known Allergies    Past Surgical History:   Procedure Laterality Date   •  SECTION         Family History   Problem Relation Age of Onset   • Diabetes Mother    • Thyroid disease Mother        Social History     Socioeconomic History   • Marital status: Single     Spouse name: Not on file   • Number of children: Not on file   • Years of education: Not on file   • Highest education level: Not on file   Tobacco Use   • Smoking status: Current Every Day Smoker     Packs/day: 0.50     Types: Cigarettes   • Smokeless tobacco: Never Used   Vaping Use   • Vaping Use: Former   Substance and Sexual Activity   • Alcohol use: No   • Drug use: Not Currently     Types: IV   • Sexual activity: Yes     Partners: Male           Objective   Physical Exam  Vitals and nursing note reviewed.   Constitutional:       General: She is not in acute distress.     Appearance: She is well-developed. She is not diaphoretic.   HENT:      Head: Normocephalic and atraumatic.      Right Ear: Tympanic membrane and  external ear normal.      Left Ear: Tympanic membrane and external ear normal.      Nose: Nose normal.      Mouth/Throat:      Pharynx: Posterior oropharyngeal erythema present.   Eyes:      Conjunctiva/sclera: Conjunctivae normal.      Pupils: Pupils are equal, round, and reactive to light.   Neck:      Vascular: No JVD.      Trachea: No tracheal deviation.   Cardiovascular:      Rate and Rhythm: Normal rate.      Heart sounds: No murmur heard.     Pulmonary:      Effort: Pulmonary effort is normal. No respiratory distress.      Breath sounds: No wheezing.   Abdominal:      Palpations: Abdomen is soft.      Tenderness: There is no abdominal tenderness.   Musculoskeletal:         General: No deformity. Normal range of motion.      Cervical back: Normal range of motion and neck supple.   Skin:     General: Skin is warm and dry.      Coloration: Skin is not pale.      Findings: No erythema or rash.   Neurological:      Mental Status: She is alert and oriented to person, place, and time.      Cranial Nerves: No cranial nerve deficit.   Psychiatric:         Behavior: Behavior normal.         Thought Content: Thought content normal.         Procedures           ED Course                                           MDM  Number of Diagnoses or Management Options  Upper respiratory tract infection, unspecified type: new and requires workup     Amount and/or Complexity of Data Reviewed  Clinical lab tests: reviewed    Risk of Complications, Morbidity, and/or Mortality  Presenting problems: low  Diagnostic procedures: low  Management options: low    Patient Progress  Patient progress: stable      Final diagnoses:   Upper respiratory tract infection, unspecified type       ED Disposition  ED Disposition     ED Disposition Condition Comment    Discharge Stable           Teresa Madrid, APRBEULAH  [address] [address] [phone]    Schedule an appointment as soon as possible for a visit             Medication List      New Prescriptions    amoxicillin-clavulanate 875-125 MG per tablet  Commonly known as: AUGMENTIN  Take 1 tablet by mouth 2 (Two) Times a Day.           Where to Get Your Medications      These medications were sent to [facility]/pharmacy #[id number] - [place], [place] - [address] [facility] - [phone]  - [phone] FX  [address] [facility] [address]    Phone: [phone]   · amoxicillin-clavulanate 875-125 MG per tablet          Cheo Willoughby II, PA  09/19/21 4454

## 2021-09-21 LAB — BACTERIA SPEC AEROBE CULT: NORMAL

## 2021-09-24 DIAGNOSIS — B18.2 CHRONIC HEPATITIS C WITHOUT HEPATIC COMA (HCC): Primary | ICD-10-CM

## 2021-10-01 ENCOUNTER — LAB (OUTPATIENT)
Dept: LAB | Facility: HOSPITAL | Age: 31
End: 2021-10-01

## 2021-10-01 ENCOUNTER — DISEASE STATE MANAGEMENT VISIT (OUTPATIENT)
Dept: PHARMACY | Facility: HOSPITAL | Age: 31
End: 2021-10-01

## 2021-10-01 VITALS
SYSTOLIC BLOOD PRESSURE: 110 MMHG | TEMPERATURE: 98.4 F | DIASTOLIC BLOOD PRESSURE: 68 MMHG | HEART RATE: 68 BPM | OXYGEN SATURATION: 98 %

## 2021-10-01 DIAGNOSIS — Z3A.01 LESS THAN 8 WEEKS GESTATION OF PREGNANCY: ICD-10-CM

## 2021-10-01 DIAGNOSIS — B18.2 CHRONIC HEPATITIS C WITHOUT HEPATIC COMA (HCC): ICD-10-CM

## 2021-10-01 DIAGNOSIS — B18.2 CHRONIC HEPATITIS C WITHOUT HEPATIC COMA (HCC): Primary | ICD-10-CM

## 2021-10-01 LAB
ALBUMIN SERPL-MCNC: 4.1 G/DL (ref 3.5–5.2)
ALBUMIN/GLOB SERPL: 1.3 G/DL
ALP SERPL-CCNC: 54 U/L (ref 39–117)
ALT SERPL W P-5'-P-CCNC: 9 U/L (ref 1–33)
ANION GAP SERPL CALCULATED.3IONS-SCNC: 9.6 MMOL/L (ref 5–15)
AST SERPL-CCNC: 16 U/L (ref 1–32)
BILIRUB SERPL-MCNC: 0.4 MG/DL (ref 0–1.2)
BUN SERPL-MCNC: 10 MG/DL (ref 6–20)
BUN/CREAT SERPL: 16.9 (ref 7–25)
CALCIUM SPEC-SCNC: 9 MG/DL (ref 8.6–10.5)
CHLORIDE SERPL-SCNC: 105 MMOL/L (ref 98–107)
CO2 SERPL-SCNC: 24.4 MMOL/L (ref 22–29)
CREAT SERPL-MCNC: 0.59 MG/DL (ref 0.57–1)
GFR SERPL CREATININE-BSD FRML MDRD: 143 ML/MIN/1.73
GLOBULIN UR ELPH-MCNC: 3.1 GM/DL
GLUCOSE SERPL-MCNC: 104 MG/DL (ref 65–99)
POTASSIUM SERPL-SCNC: 3.5 MMOL/L (ref 3.5–5.2)
PROT SERPL-MCNC: 7.2 G/DL (ref 6–8.5)
SODIUM SERPL-SCNC: 139 MMOL/L (ref 136–145)

## 2021-10-01 PROCEDURE — 99214 OFFICE O/P EST MOD 30 MIN: CPT | Performed by: PHYSICIAN ASSISTANT

## 2021-10-01 PROCEDURE — 87522 HEPATITIS C REVRS TRNSCRPJ: CPT

## 2021-10-01 PROCEDURE — 36415 COLL VENOUS BLD VENIPUNCTURE: CPT

## 2021-10-01 PROCEDURE — 80053 COMPREHEN METABOLIC PANEL: CPT

## 2021-10-01 NOTE — PROGRESS NOTES
Follow Up Note     Date: 10/01/2021   Patient Name: Juvencio Ramirez  MRN: [id number]  : 1990     Primary Care Provider: Teresa Madrid APRN     Chief Complaint   Patient presents with   • Hepatitis C     Pt here for 4 week follow up , pt just found out about 2 days ago that she is pregnant     History of present illness:   10/8/2021  Juvencio Ramirez is a 31 y.o. female who is here today for follow up regarding Hepatitis C (Pt here for 4 week follow up , pt just found out about 2 days ago that she is pregnant).    She has been taking Epclusa 1 tab PO once daily for the past 4 weeks. She has not missed any doses of this medication. She had side effects initially of fatigue which is better now. She also had abdominal pain and nausea but she also had constipation at the time. Miralax daily seemed to help. She continues to abstain from alcohol and illicit drug use. Had positive home pregnancy test 2 days ago, she was not trying to get pregnant.     Interval History:  2021  She found out about having Hepatitis C infection approx 2 years ago. She has not had prior treatment for hepatitis. She found out that she had it during incarceration. Reports no known personal history of liver disease including other viral hepatitis. There is no known family history of liver disease or cirrhosis. She admits to previous IVDU and intranasal drug use. She has been clean now for 5 years. She does not have nonprofessional tattoos. Denies having previous alcoholism. She does currently drink alcohol socially in small amounts. She denies  current illicit drug use including marijuana. She did have recent liver imaging. She has had recent labs. She has had previous vaccinations for Hepatitis A (she thinks).     Subjective     Past Medical History:   Diagnosis Date   • Hepatitis C    • Herpes    • Infectious viral hepatitis    • Ovarian cyst      Past Surgical History:   Procedure Laterality Date   •  SECTION        Family History   Problem Relation Age of Onset   • Diabetes Mother    • Thyroid disease Mother      Social History     Socioeconomic History   • Marital status: Single     Spouse name: Not on file   • Number of children: Not on file   • Years of education: Not on file   • Highest education level: Not on file   Tobacco Use   • Smoking status: Current Every Day Smoker     Packs/day: 0.50     Types: Cigarettes   • Smokeless tobacco: Never Used   Vaping Use   • Vaping Use: Former   Substance and Sexual Activity   • Alcohol use: No   • Drug use: Not Currently     Types: IV   • Sexual activity: Yes     Partners: Male     Current Outpatient Medications:   •  amoxicillin-clavulanate (AUGMENTIN) 875-125 MG per tablet, Take 1 tablet by mouth 2 (Two) Times a Day., Disp: 20 tablet, Rfl: 0  •  levocetirizine (XYZAL) 5 MG tablet, Take 5 mg by mouth Daily., Disp: , Rfl:   •  norethindrone-ethinyl estradiol (Alyacen 1/35) 1-35 MG-MCG per tablet, Take 1 tablet by mouth Daily., Disp: , Rfl:   •  Sofosbuvir-Velpatasvir 400-100 MG tablet, Take 1 tablet by mouth Daily., Disp: 28 tablet, Rfl: 2  •  VITAMIN D PO, Take 1 tablet by mouth Daily., Disp: , Rfl:     No Known Allergies    The following portions of the patient's history were reviewed and updated as appropriate: allergies, current medications, past family history, past medical history, past social history, past surgical history and problem list.    Objective     Physical Exam  Constitutional:       General: She is not in acute distress.     Appearance: Normal appearance. She is well-developed. She is not diaphoretic.   HENT:      Head: Normocephalic and atraumatic.      Right Ear: External ear normal.      Left Ear: External ear normal.      Nose: Nose normal.      Mouth/Throat:      Comments: Wearing a mask  Eyes:      General: No scleral icterus.        Right eye: No discharge.         Left eye: No discharge.      Conjunctiva/sclera: Conjunctivae normal.   Neck:       Trachea: No tracheal deviation.   Pulmonary:      Effort: Pulmonary effort is normal. No respiratory distress.   Musculoskeletal:         General: Normal range of motion.      Cervical back: Normal range of motion.   Skin:     Coloration: Skin is not pale.      Findings: No erythema or rash.   Neurological:      Mental Status: She is alert and oriented to person, place, and time.      Coordination: Coordination normal.   Psychiatric:         Mood and Affect: Mood normal.         Behavior: Behavior normal.         Thought Content: Thought content normal.         Judgment: Judgment normal.       Vitals:    10/01/21 0900   BP: 110/68   Pulse: 68   Temp: 98.4 °F (36.9 °C)   SpO2: 98%     Results Review:   I reviewed the patient's new clinical results.    Lab on 09/09/2021   Component Date Value Ref Range Status   • Glucose 09/09/2021 91  65 - 99 mg/dL Final   • BUN 09/09/2021 11  6 - 20 mg/dL Final   • Creatinine 09/09/2021 0.32* 0.57 - 1.00 mg/dL Final   • Sodium 09/09/2021 140  136 - 145 mmol/L Final   • Potassium 09/09/2021 3.7  3.5 - 5.2 mmol/L Final   • Chloride 09/09/2021 104  98 - 107 mmol/L Final   • CO2 09/09/2021 23.3  22.0 - 29.0 mmol/L Final   • Calcium 09/09/2021 9.5  8.6 - 10.5 mg/dL Final   • Total Protein 09/09/2021 7.6  6.0 - 8.5 g/dL Final   • Albumin 09/09/2021 4.40  3.50 - 5.20 g/dL Final   • ALT (SGPT) 09/09/2021 12  1 - 33 U/L Final   • AST (SGOT) 09/09/2021 14  1 - 32 U/L Final   • Alkaline Phosphatase 09/09/2021 57  39 - 117 U/L Final   • Total Bilirubin 09/09/2021 0.3  0.0 - 1.2 mg/dL Final   • eGFR   Amer 09/09/2021 >150  >60 mL/min/1.73 Final   • Globulin 09/09/2021 3.2  gm/dL Final   • A/G Ratio 09/09/2021 1.4  g/dL Final   • BUN/Creatinine Ratio 09/09/2021 34.4* 7.0 - 25.0 Final   • Anion Gap 09/09/2021 12.7  5.0 - 15.0 mmol/L Final   • WBC 09/09/2021 7.25  3.40 - 10.80 10*3/mm3 Final   • RBC 09/09/2021 4.13  3.77 - 5.28 10*6/mm3 Final   • Hemoglobin 09/09/2021 13.5  12.0 - 15.9 g/dL  Final   • Hematocrit 09/09/2021 41.1  34.0 - 46.6 % Final   • MCV 09/09/2021 99.5* 79.0 - 97.0 fL Final   • MCH 09/09/2021 32.7  26.6 - 33.0 pg Final   • MCHC 09/09/2021 32.8  31.5 - 35.7 g/dL Final   • RDW 09/09/2021 11.9* 12.3 - 15.4 % Final   • RDW-SD 09/09/2021 43.9  37.0 - 54.0 fl Final   • MPV 09/09/2021 12.5* 6.0 - 12.0 fL Final   • Platelets 09/09/2021 293  140 - 450 10*3/mm3 Final   Lab on 08/20/2021   Component Date Value Ref Range Status   • Protime 08/20/2021 13.5  12.0 - 15.1 Seconds Final   • INR 08/20/2021 0.98  0.90 - 1.10 Final   • Glucose 08/20/2021 84  65 - 99 mg/dL Final   • BUN 08/20/2021 12  6 - 20 mg/dL Final   • Creatinine 08/20/2021 0.65  0.57 - 1.00 mg/dL Final   • Sodium 08/20/2021 141  136 - 145 mmol/L Final   • Potassium 08/20/2021 4.0  3.5 - 5.2 mmol/L Final   • Chloride 08/20/2021 107  98 - 107 mmol/L Final   • CO2 08/20/2021 25.1  22.0 - 29.0 mmol/L Final   • Calcium 08/20/2021 9.3  8.6 - 10.5 mg/dL Final   • Total Protein 08/20/2021 7.6  6.0 - 8.5 g/dL Final   • Albumin 08/20/2021 4.30  3.50 - 5.20 g/dL Final   • ALT (SGPT) 08/20/2021 24  1 - 33 U/L Final   • AST (SGOT) 08/20/2021 19  1 - 32 U/L Final   • Alkaline Phosphatase 08/20/2021 56  39 - 117 U/L Final   • Total Bilirubin 08/20/2021 0.4  0.0 - 1.2 mg/dL Final   • eGFR   Amer 08/20/2021 128  >60 mL/min/1.73 Final   • Globulin 08/20/2021 3.3  gm/dL Final   • A/G Ratio 08/20/2021 1.3  g/dL Final   • BUN/Creatinine Ratio 08/20/2021 18.5  7.0 - 25.0 Final   • Anion Gap 08/20/2021 8.9  5.0 - 15.0 mmol/L Final   • HCG Qualitative 08/20/2021 Negative  Negative Final   • Fibrosis Score 08/20/2021 0.06  0.00 - 0.21 Final   • Fibrosis Stage 08/20/2021 Comment   Final                       F0 - No fibrosis   • Necroinflammat Activity Score 08/20/2021 0.07  0.00 - 0.17 Final   • Necroinflammat Activity Grade 08/20/2021 A0-No activity   Final   • Alpha 2-Macroglobulins, Qn 08/20/2021 170  110 - 276 mg/dL Final   • Haptoglobin  08/20/2021 81  33 - 278 mg/dL Final   • Apolipoprotein A-1 08/20/2021 154  116 - 209 mg/dL Final   • Total Bilirubin 08/20/2021 0.4  0.0 - 1.2 mg/dL Final   • GGT 08/20/2021 12  0 - 60 IU/L Final   • ALT (SGPT) 08/20/2021 22  0 - 40 IU/L Final   • Hepatitis C Quantitation 08/20/2021 02001  IU/mL Final   • HCV log10 08/20/2021 4.783  log10 IU/mL Final   • Hep A Total Ab 08/20/2021 Positive* Negative Final   • Hep B Core Total Ab 08/20/2021 Negative  Negative Final   • Hep B S Ab 08/20/2021 Reactive* Non-Reactive Final   • THC, Screen, Urine 08/20/2021 Negative  Negative Final   • Phencyclidine (PCP), Urine 08/20/2021 Negative  Negative Final   • Cocaine Screen, Urine 08/20/2021 Negative  Negative Final   • Methamphetamine, Ur 08/20/2021 Negative  Negative Final   • Opiate Screen 08/20/2021 Negative  Negative Final   • Amphetamine Screen, Urine 08/20/2021 Negative  Negative Final   • Benzodiazepine Screen, Urine 08/20/2021 Negative  Negative Final   • Tricyclic Antidepressants Screen 08/20/2021 Negative  Negative Final   • Methadone Screen, Urine 08/20/2021 Negative  Negative Final   • Barbiturates Screen, Urine 08/20/2021 Negative  Negative Final   • Oxycodone Screen, Urine 08/20/2021 Negative  Negative Final   • Propoxyphene Screen 08/20/2021 Negative  Negative Final   • Buprenorphine, Screen, Urine 08/20/2021 Negative  Negative Final   • Hepatitis C Genotype 08/20/2021 1a   Final      US Liver  Result Date: 8/10/2021  Normal liver ultrasound.       Assessment / Plan      1. Chronic hepatitis C without hepatic coma   10/1/2021  Has been taking Epclusa 1 tab PO once daily for 4 weeks. She found out about being pregnant 2 days ago. She will stop Hepatitis C treatment due to contraindication to take during pregnancy. Will check HCV and CMP today then again in 3 months to determine if she has been cured with shorter than planned treatment duration.     08/20/21   She has chronic hepatitis C, genotype unknown, treatment  naïve, without suspected cirrhosis.  She will need labs prior to treatment including repeat CMP, pregnancy screening, fibrosis staging, repeat HCVRNA quant, labs to determine immunity hepatitis A and B, hepatitis B core total antibody, INR.  More recommendations will be made after these results have been reviewed. She will continue to abstain from alcohol and illegal drugs. She will have US liver to determine if any lesions or other liver diseases present. Immunity to Hep A and B will be determined and vaccinations recommended if needed. After review of these results and discussion with with the patient, we will proceed with Hep C therapy and will submit Rx to insurance company for approval. Treatment will depend on fibrosis score and Hep C genotype. When approved, she will  Rx from our pharmacy and start taking exactly as directed. Hep C viral load lab (HCV RNA quant) and CMP will be checked 4 weeks after start of therapy, at end of therapy and 3 months s/p therapy to determine response to treatment and cure. She will call with concerns.      2. Less than 8 weeks gestation of pregnancy  10/1/2021  She will stop Hepatitis C treatment (Epclusa) due to current pregnancy. She will follow with OBGYN. We will check labs today and again in 3 months to determine her response to Hepatitis C treatment.           Prior history:  History of illicit drug use  08/20/21  She has a prior history of IV drug use, has been clean for 5 years.  Will complete urine drug screen today prior to starting any treatment.  She understands that even after hepatitis C treatment, she is not immune to hepatitis C and can contract again if she continues any risky behaviors.         Follow Up:   Return for lab in about 3 months (around 1/1/2022) for recheck Hepatitis C.      Jillian Robison PA-C  [facility]  10/8/2021  12:53 EDT    Please note that portions of this note may have been completed with a voice recognition program.  Efforts were made to edit the dictations, but occasionally words are mistranscribed.

## 2021-10-02 LAB
HCV RNA SERPL NAA+PROBE-ACNC: NORMAL IU/ML
TEST INFORMATION: NORMAL

## 2021-10-08 ENCOUNTER — TELEPHONE (OUTPATIENT)
Dept: GASTROENTEROLOGY | Facility: CLINIC | Age: 31
End: 2021-10-08

## 2021-10-08 DIAGNOSIS — B18.2 CHRONIC HEPATITIS C WITHOUT HEPATIC COMA (HCC): Primary | ICD-10-CM

## 2021-10-08 NOTE — TELEPHONE ENCOUNTER
Labs at 4 weeks into treatment with Epclusa show HCV not detected and liver enzymes normal. She is pregnant so stopped Epclusa therapy. Recommend labs again in 3 months to determine cure.

## 2021-10-11 NOTE — TELEPHONE ENCOUNTER
Pt called back, lab results provided and informed that we need to redraw labs in 3 months to determine cure.     Pt is pleased with results and verbalized understanding of next steps.     I will had to my calendar to call and remind her to have the labs drawn.

## 2021-10-28 ENCOUNTER — APPOINTMENT (OUTPATIENT)
Dept: ULTRASOUND IMAGING | Facility: HOSPITAL | Age: 31
End: 2021-10-28

## 2021-10-28 ENCOUNTER — HOSPITAL ENCOUNTER (EMERGENCY)
Facility: HOSPITAL | Age: 31
Discharge: HOME | End: 2021-10-28
Attending: EMERGENCY MEDICINE | Admitting: EMERGENCY MEDICINE

## 2021-10-28 VITALS
SYSTOLIC BLOOD PRESSURE: 122 MMHG | RESPIRATION RATE: 14 BRPM | HEIGHT: 66 IN | TEMPERATURE: 98.7 F | HEART RATE: 83 BPM | BODY MASS INDEX: 22.56 KG/M2 | WEIGHT: 140.4 LBS | OXYGEN SATURATION: 100 % | DIASTOLIC BLOOD PRESSURE: 67 MMHG

## 2021-10-28 DIAGNOSIS — O20.0 THREATENED MISCARRIAGE: Primary | ICD-10-CM

## 2021-10-28 LAB
ABO GROUP BLD: NORMAL
ALBUMIN SERPL-MCNC: 4.8 G/DL (ref 3.5–5.2)
ALBUMIN/GLOB SERPL: 1.3 G/DL
ALP SERPL-CCNC: 60 U/L (ref 39–117)
ALT SERPL W P-5'-P-CCNC: 7 U/L (ref 1–33)
ANION GAP SERPL CALCULATED.3IONS-SCNC: 12.2 MMOL/L (ref 5–15)
AST SERPL-CCNC: 12 U/L (ref 1–32)
B-HCG UR QL: POSITIVE
BASOPHILS # BLD AUTO: 0.05 10*3/MM3 (ref 0–0.2)
BASOPHILS NFR BLD AUTO: 0.4 % (ref 0–1.5)
BILIRUB SERPL-MCNC: 0.3 MG/DL (ref 0–1.2)
BILIRUB UR QL STRIP: NEGATIVE
BUN SERPL-MCNC: 10 MG/DL (ref 6–20)
BUN/CREAT SERPL: 20 (ref 7–25)
CALCIUM SPEC-SCNC: 9.6 MG/DL (ref 8.6–10.5)
CHLORIDE SERPL-SCNC: 101 MMOL/L (ref 98–107)
CLARITY UR: CLEAR
CO2 SERPL-SCNC: 22.8 MMOL/L (ref 22–29)
COLOR UR: YELLOW
CREAT SERPL-MCNC: 0.5 MG/DL (ref 0.57–1)
DEPRECATED RDW RBC AUTO: 45.1 FL (ref 37–54)
EOSINOPHIL # BLD AUTO: 0.09 10*3/MM3 (ref 0–0.4)
EOSINOPHIL NFR BLD AUTO: 0.8 % (ref 0.3–6.2)
ERYTHROCYTE [DISTWIDTH] IN BLOOD BY AUTOMATED COUNT: 12.6 % (ref 12.3–15.4)
GFR SERPL CREATININE-BSD FRML MDRD: >150 ML/MIN/1.73
GLOBULIN UR ELPH-MCNC: 3.7 GM/DL
GLUCOSE SERPL-MCNC: 85 MG/DL (ref 65–99)
GLUCOSE UR STRIP-MCNC: NEGATIVE MG/DL
HCG INTACT+B SERPL-ACNC: NORMAL MIU/ML
HCT VFR BLD AUTO: 39.2 % (ref 34–46.6)
HGB BLD-MCNC: 13.3 G/DL (ref 12–15.9)
HGB UR QL STRIP.AUTO: NEGATIVE
IMM GRANULOCYTES # BLD AUTO: 0.05 10*3/MM3 (ref 0–0.05)
IMM GRANULOCYTES NFR BLD AUTO: 0.4 % (ref 0–0.5)
KETONES UR QL STRIP: ABNORMAL
LEUKOCYTE ESTERASE UR QL STRIP.AUTO: NEGATIVE
LYMPHOCYTES # BLD AUTO: 2.88 10*3/MM3 (ref 0.7–3.1)
LYMPHOCYTES NFR BLD AUTO: 25.4 % (ref 19.6–45.3)
MCH RBC QN AUTO: 32.9 PG (ref 26.6–33)
MCHC RBC AUTO-ENTMCNC: 33.9 G/DL (ref 31.5–35.7)
MCV RBC AUTO: 97 FL (ref 79–97)
MONOCYTES # BLD AUTO: 0.73 10*3/MM3 (ref 0.1–0.9)
MONOCYTES NFR BLD AUTO: 6.4 % (ref 5–12)
NEUTROPHILS NFR BLD AUTO: 66.6 % (ref 42.7–76)
NEUTROPHILS NFR BLD AUTO: 7.52 10*3/MM3 (ref 1.7–7)
NITRITE UR QL STRIP: NEGATIVE
NRBC BLD AUTO-RTO: 0 /100 WBC (ref 0–0.2)
PH UR STRIP.AUTO: 6.5 [PH] (ref 5–8)
PLATELET # BLD AUTO: 288 10*3/MM3 (ref 140–450)
PMV BLD AUTO: 10.6 FL (ref 6–12)
POTASSIUM SERPL-SCNC: 3.5 MMOL/L (ref 3.5–5.2)
PROT SERPL-MCNC: 8.5 G/DL (ref 6–8.5)
PROT UR QL STRIP: NEGATIVE
RBC # BLD AUTO: 4.04 10*6/MM3 (ref 3.77–5.28)
RH BLD: POSITIVE
SODIUM SERPL-SCNC: 136 MMOL/L (ref 136–145)
SP GR UR STRIP: 1.01 (ref 1–1.03)
UROBILINOGEN UR QL STRIP: ABNORMAL
WBC # BLD AUTO: 11.32 10*3/MM3 (ref 3.4–10.8)

## 2021-10-28 PROCEDURE — 85025 COMPLETE CBC W/AUTO DIFF WBC: CPT | Performed by: PHYSICIAN ASSISTANT

## 2021-10-28 PROCEDURE — 81003 URINALYSIS AUTO W/O SCOPE: CPT | Performed by: PHYSICIAN ASSISTANT

## 2021-10-28 PROCEDURE — 76817 TRANSVAGINAL US OBSTETRIC: CPT

## 2021-10-28 PROCEDURE — 81025 URINE PREGNANCY TEST: CPT | Performed by: PHYSICIAN ASSISTANT

## 2021-10-28 PROCEDURE — 80053 COMPREHEN METABOLIC PANEL: CPT | Performed by: PHYSICIAN ASSISTANT

## 2021-10-28 PROCEDURE — 99283 EMERGENCY DEPT VISIT LOW MDM: CPT

## 2021-10-28 PROCEDURE — 84702 CHORIONIC GONADOTROPIN TEST: CPT | Performed by: PHYSICIAN ASSISTANT

## 2021-10-28 PROCEDURE — 86900 BLOOD TYPING SEROLOGIC ABO: CPT | Performed by: PHYSICIAN ASSISTANT

## 2021-10-28 PROCEDURE — 86901 BLOOD TYPING SEROLOGIC RH(D): CPT | Performed by: PHYSICIAN ASSISTANT

## 2021-12-09 ENCOUNTER — TRANSCRIBE ORDERS (OUTPATIENT)
Dept: WOMENS IMAGING | Facility: HOSPITAL | Age: 31
End: 2021-12-09

## 2021-12-09 DIAGNOSIS — Z36.89 SCREENING, ANTENATAL, FOR FETAL ANATOMIC SURVEY: Primary | ICD-10-CM

## 2022-01-04 ENCOUNTER — TELEPHONE (OUTPATIENT)
Dept: PHARMACY | Facility: HOSPITAL | Age: 32
End: 2022-01-04

## 2022-01-04 NOTE — TELEPHONE ENCOUNTER
Spoke with patient and reminded her that Jillian wanted her to get her labs redrawn three months from her last visit.     Pt verbalized understanding.

## 2022-01-06 ENCOUNTER — LAB (OUTPATIENT)
Dept: LAB | Facility: HOSPITAL | Age: 32
End: 2022-01-06

## 2022-01-06 DIAGNOSIS — B18.2 CHRONIC HEPATITIS C WITHOUT HEPATIC COMA: ICD-10-CM

## 2022-01-06 LAB
ALBUMIN SERPL-MCNC: 3.8 G/DL (ref 3.5–5.2)
ALBUMIN/GLOB SERPL: 1.2 G/DL
ALP SERPL-CCNC: 48 U/L (ref 39–117)
ALT SERPL W P-5'-P-CCNC: 6 U/L (ref 1–33)
ANION GAP SERPL CALCULATED.3IONS-SCNC: 8.3 MMOL/L (ref 5–15)
AST SERPL-CCNC: 12 U/L (ref 1–32)
BILIRUB SERPL-MCNC: 0.3 MG/DL (ref 0–1.2)
BUN SERPL-MCNC: 7 MG/DL (ref 6–20)
BUN/CREAT SERPL: 14.9 (ref 7–25)
CALCIUM SPEC-SCNC: 8.8 MG/DL (ref 8.6–10.5)
CHLORIDE SERPL-SCNC: 105 MMOL/L (ref 98–107)
CO2 SERPL-SCNC: 23.7 MMOL/L (ref 22–29)
CREAT SERPL-MCNC: 0.47 MG/DL (ref 0.57–1)
GFR SERPL CREATININE-BSD FRML MDRD: >150 ML/MIN/1.73
GLOBULIN UR ELPH-MCNC: 3.2 GM/DL
GLUCOSE SERPL-MCNC: 83 MG/DL (ref 65–99)
POTASSIUM SERPL-SCNC: 3.9 MMOL/L (ref 3.5–5.2)
PROT SERPL-MCNC: 7 G/DL (ref 6–8.5)
SODIUM SERPL-SCNC: 137 MMOL/L (ref 136–145)

## 2022-01-06 PROCEDURE — 36415 COLL VENOUS BLD VENIPUNCTURE: CPT

## 2022-01-06 PROCEDURE — 87522 HEPATITIS C REVRS TRNSCRPJ: CPT

## 2022-01-06 PROCEDURE — 80053 COMPREHEN METABOLIC PANEL: CPT

## 2022-01-10 ENCOUNTER — OFFICE VISIT (OUTPATIENT)
Dept: OBSTETRICS AND GYNECOLOGY | Facility: HOSPITAL | Age: 32
End: 2022-01-10

## 2022-01-10 ENCOUNTER — HOSPITAL ENCOUNTER (OUTPATIENT)
Dept: WOMENS IMAGING | Facility: HOSPITAL | Age: 32
Discharge: HOME | End: 2022-01-10
Admitting: OBSTETRICS & GYNECOLOGY

## 2022-01-10 VITALS
DIASTOLIC BLOOD PRESSURE: 64 MMHG | SYSTOLIC BLOOD PRESSURE: 104 MMHG | BODY MASS INDEX: 23.82 KG/M2 | HEIGHT: 65 IN | WEIGHT: 143 LBS

## 2022-01-10 DIAGNOSIS — Z36.89 SCREENING, ANTENATAL, FOR FETAL ANATOMIC SURVEY: ICD-10-CM

## 2022-01-10 DIAGNOSIS — Z3A.20 20 WEEKS GESTATION OF PREGNANCY: ICD-10-CM

## 2022-01-10 DIAGNOSIS — Z98.891 HISTORY OF 2 CESAREAN SECTIONS: Primary | ICD-10-CM

## 2022-01-10 LAB
HCV RNA SERPL NAA+PROBE-ACNC: NORMAL IU/ML
TEST INFORMATION: NORMAL

## 2022-01-10 PROCEDURE — 76811 OB US DETAILED SNGL FETUS: CPT

## 2022-01-10 PROCEDURE — 76811 OB US DETAILED SNGL FETUS: CPT | Performed by: OBSTETRICS & GYNECOLOGY

## 2022-01-10 RX ORDER — PRENATAL WITH FERROUS FUM AND FOLIC ACID 3080; 920; 120; 400; 22; 1.84; 3; 20; 10; 1; 12; 200; 27; 25; 2 [IU]/1; [IU]/1; MG/1; [IU]/1; MG/1; MG/1; MG/1; MG/1; MG/1; MG/1; UG/1; MG/1; MG/1; MG/1; MG/1
1 TABLET ORAL DAILY
COMMUNITY
End: 2023-02-15 | Stop reason: HOSPADM

## 2022-01-10 NOTE — PROGRESS NOTES
Patient seen in Maternal Fetal Medicine clinic today. Please see full note in under imaging tab of patient chart in Epic (Viewpoint report).    Sima Velasquez MD

## 2022-01-11 ENCOUNTER — TELEPHONE (OUTPATIENT)
Dept: GASTROENTEROLOGY | Facility: CLINIC | Age: 32
End: 2022-01-11

## 2022-01-11 NOTE — TELEPHONE ENCOUNTER
HCV not detected on most recent labs, these were 3 months after her 1 month of therapy which she discontinued due to pregnancy. Please let her know these results and it appears the treatment she took has worked. Will plan to re-check HCV RNA again in 3 more months as final lab.

## 2022-01-11 NOTE — TELEPHONE ENCOUNTER
Spoke with patient and provided results, pt is happy about results. Informed her that labs would need to be checked once more in 3 months as a final lab but as of know the treatment that she did receive worked for her.   Pt verbalized understanding.    Reminder set to call the patient closer to time for her labs to be drawn.

## 2022-05-16 DIAGNOSIS — B18.2 CHRONIC HEPATITIS C WITHOUT HEPATIC COMA: Primary | ICD-10-CM

## 2022-07-01 ENCOUNTER — HOSPITAL ENCOUNTER (EMERGENCY)
Facility: HOSPITAL | Age: 32
Discharge: HOME | End: 2022-07-01
Attending: EMERGENCY MEDICINE | Admitting: EMERGENCY MEDICINE

## 2022-07-01 VITALS
SYSTOLIC BLOOD PRESSURE: 143 MMHG | RESPIRATION RATE: 16 BRPM | TEMPERATURE: 97.9 F | OXYGEN SATURATION: 100 % | HEIGHT: 66 IN | HEART RATE: 87 BPM | BODY MASS INDEX: 22.18 KG/M2 | DIASTOLIC BLOOD PRESSURE: 106 MMHG | WEIGHT: 138 LBS

## 2022-07-01 DIAGNOSIS — N93.9 EPISODE OF HEAVY VAGINAL BLEEDING: Primary | ICD-10-CM

## 2022-07-01 LAB
ALBUMIN SERPL-MCNC: 4.4 G/DL (ref 3.5–5.2)
ALBUMIN/GLOB SERPL: 1.5 G/DL
ALP SERPL-CCNC: 68 U/L (ref 39–117)
ALT SERPL W P-5'-P-CCNC: 6 U/L (ref 1–33)
ANION GAP SERPL CALCULATED.3IONS-SCNC: 9.6 MMOL/L (ref 5–15)
APTT PPP: 32.5 SECONDS (ref 70–100)
AST SERPL-CCNC: 11 U/L (ref 1–32)
BASOPHILS # BLD AUTO: 0.06 10*3/MM3 (ref 0–0.2)
BASOPHILS NFR BLD AUTO: 0.8 % (ref 0–1.5)
BILIRUB SERPL-MCNC: 0.3 MG/DL (ref 0–1.2)
BUN SERPL-MCNC: 11 MG/DL (ref 6–20)
BUN/CREAT SERPL: 17.5 (ref 7–25)
CALCIUM SPEC-SCNC: 9.3 MG/DL (ref 8.6–10.5)
CHLORIDE SERPL-SCNC: 103 MMOL/L (ref 98–107)
CO2 SERPL-SCNC: 25.4 MMOL/L (ref 22–29)
CREAT SERPL-MCNC: 0.63 MG/DL (ref 0.57–1)
DEPRECATED RDW RBC AUTO: 43.7 FL (ref 37–54)
EGFRCR SERPLBLD CKD-EPI 2021: 120.3 ML/MIN/1.73
EOSINOPHIL # BLD AUTO: 0.17 10*3/MM3 (ref 0–0.4)
EOSINOPHIL NFR BLD AUTO: 2.3 % (ref 0.3–6.2)
ERYTHROCYTE [DISTWIDTH] IN BLOOD BY AUTOMATED COUNT: 12.3 % (ref 12.3–15.4)
GLOBULIN UR ELPH-MCNC: 3 GM/DL
GLUCOSE SERPL-MCNC: 90 MG/DL (ref 65–99)
HCT VFR BLD AUTO: 37.2 % (ref 34–46.6)
HGB BLD-MCNC: 12.5 G/DL (ref 12–15.9)
IMM GRANULOCYTES # BLD AUTO: 0.01 10*3/MM3 (ref 0–0.05)
IMM GRANULOCYTES NFR BLD AUTO: 0.1 % (ref 0–0.5)
INR PPP: 1.01 (ref 0.9–1.1)
LYMPHOCYTES # BLD AUTO: 2.49 10*3/MM3 (ref 0.7–3.1)
LYMPHOCYTES NFR BLD AUTO: 34.3 % (ref 19.6–45.3)
MCH RBC QN AUTO: 32.6 PG (ref 26.6–33)
MCHC RBC AUTO-ENTMCNC: 33.6 G/DL (ref 31.5–35.7)
MCV RBC AUTO: 96.9 FL (ref 79–97)
MONOCYTES # BLD AUTO: 0.54 10*3/MM3 (ref 0.1–0.9)
MONOCYTES NFR BLD AUTO: 7.4 % (ref 5–12)
NEUTROPHILS NFR BLD AUTO: 3.98 10*3/MM3 (ref 1.7–7)
NEUTROPHILS NFR BLD AUTO: 55.1 % (ref 42.7–76)
NRBC BLD AUTO-RTO: 0 /100 WBC (ref 0–0.2)
PLATELET # BLD AUTO: 250 10*3/MM3 (ref 140–450)
PMV BLD AUTO: 10.8 FL (ref 6–12)
POTASSIUM SERPL-SCNC: 3.7 MMOL/L (ref 3.5–5.2)
PROT SERPL-MCNC: 7.4 G/DL (ref 6–8.5)
PROTHROMBIN TIME: 13.6 SECONDS (ref 12.5–14.5)
RBC # BLD AUTO: 3.84 10*6/MM3 (ref 3.77–5.28)
SODIUM SERPL-SCNC: 138 MMOL/L (ref 136–145)
WBC NRBC COR # BLD: 7.25 10*3/MM3 (ref 3.4–10.8)

## 2022-07-01 PROCEDURE — 85730 THROMBOPLASTIN TIME PARTIAL: CPT | Performed by: PHYSICIAN ASSISTANT

## 2022-07-01 PROCEDURE — 99283 EMERGENCY DEPT VISIT LOW MDM: CPT

## 2022-07-01 PROCEDURE — 85025 COMPLETE CBC W/AUTO DIFF WBC: CPT | Performed by: PHYSICIAN ASSISTANT

## 2022-07-01 PROCEDURE — 85610 PROTHROMBIN TIME: CPT | Performed by: PHYSICIAN ASSISTANT

## 2022-07-01 PROCEDURE — 80053 COMPREHEN METABOLIC PANEL: CPT | Performed by: PHYSICIAN ASSISTANT

## 2022-07-01 NOTE — ED PROVIDER NOTES
Subjective   History of Present Illness   Patient is a 32-year-old female with history of hepatitis C, herpes, and ovarian cyst presenting to the ER 6 weeks postpartum for vaginal bleeding.  Patient states that she started her first period postpartum and it has been very heavy.  She states that she started it Monday and she saw her OB for her 6-week appointment on Wednesday.  She states that the bleeding began getting much heavier after she went to her OB appointment.  She states that they did not do a pelvic exam.  She says that she had a  and they just checked her incision.  She denies any vaginal trauma or recent procedures other than the .  She denies any additional symptoms or complaints at this time.  She states that she has been wearing a super tampon and a pad and has been going through 1 every 45 minutes and sometimes less and thought she should get checked out.    Review of Systems   Genitourinary: Positive for vaginal bleeding.   All other systems reviewed and are negative.      Past Medical History:   Diagnosis Date   • Hepatitis C    • Herpes    • Infectious viral hepatitis    • Ovarian cyst        No Known Allergies    Past Surgical History:   Procedure Laterality Date   •  SECTION     •  SECTION PRIMARY  2008       Family History   Problem Relation Age of Onset   • Diabetes Mother    • Thyroid disease Mother        Social History     Socioeconomic History   • Marital status: Single   Tobacco Use   • Smoking status: Current Every Day Smoker     Packs/day: 0.50     Types: Cigarettes   • Smokeless tobacco: Never Used   Vaping Use   • Vaping Use: Former   • Devices: Disposable   Substance and Sexual Activity   • Alcohol use: No   • Drug use: Not Currently     Comment: hx IV drug use, none in 5 years   • Sexual activity: Yes     Partners: Male           Objective   Physical Exam  Vitals and nursing note reviewed.   Constitutional:       General: She is not in acute  distress.     Appearance: She is not toxic-appearing.   HENT:      Head: Normocephalic and atraumatic.      Right Ear: External ear normal.      Left Ear: External ear normal.      Nose: Nose normal.   Eyes:      Extraocular Movements: Extraocular movements intact.      Conjunctiva/sclera: Conjunctivae normal.   Cardiovascular:      Rate and Rhythm: Normal rate.      Heart sounds: Normal heart sounds.   Pulmonary:      Effort: Pulmonary effort is normal.      Breath sounds: Normal breath sounds.   Abdominal:      General: There is no distension.      Palpations: Abdomen is soft.      Tenderness: There is abdominal tenderness (mild). There is no guarding or rebound.      Comments: nonsurgical abdomen   Musculoskeletal:         General: Normal range of motion.      Cervical back: Normal range of motion and neck supple.   Skin:     General: Skin is warm and dry.   Neurological:      General: No focal deficit present.      Mental Status: She is alert and oriented to person, place, and time.   Psychiatric:         Mood and Affect: Mood normal.         Behavior: Behavior normal.         Procedures           ED Course  ED Course as of 07/01/22 1958 Fri Jul 01, 2022 1941 PTT(!): 32.5 [AP]   1941 INR: 1.01 [AP]   1941 Protime: 13.6 [AP]   1941 WBC: 7.25 [AP]   1941 RBC: 3.84 [AP]   1941 Hemoglobin: 12.5 [AP]   1941 Hematocrit: 37.2 [AP]   1941 Platelets: 250 [AP]   1941 Glucose: 90 [AP]   1941 BUN: 11 [AP]   1941 Creatinine: 0.63 [AP]   1941 Sodium: 138 [AP]   1941 Potassium: 3.7 [AP]   1941 Chloride: 103 [AP]   1941 CO2: 25.4 [AP]   1941 Calcium: 9.3 [AP]   1941 Total Protein: 7.4 [AP]   1941 Albumin: 4.40 [AP]   1941 ALT (SGPT): 6 [AP]   1941 AST (SGOT): 11 [AP]   1941 Alkaline Phosphatase: 68 [AP]   1941 Total Bilirubin: 0.3 [AP]   1941 Globulin: 3.0 [AP]   1941 A/G Ratio: 1.5 [AP]   1941 BUN/Creatinine Ratio: 17.5 [AP]   1941 Anion Gap: 9.6 [AP]   1941 eGFR: 120.3 [AP]      ED Course User Index  [AP] Witt, Elisabeth P,  JULIANNE                                           [facility]   Patient was evaluated in the ER for vaginal bleeding.  She is hemodynamically stable with blood pressure of 138/100, no tachycardia, oxygen saturation of 100% on room air.  No acute distress.  Labs are unremarkable with normal hemoglobin, liver function, kidney function, and electrolytes.  I personally rechecked the patient's blood pressure and heart rate which were both within normal limits.  Blood pressure of 141/101.  Patient was advised that if her blood pressure stays high she should follow-up with her PCP for further outpatient evaluation.  She states that vaginal bleeding has slowed down and is agreeable with plan for discharge.  She was advised to follow-up with her OB/GYN for further outpatient evaluation if heavy vaginal bleeding continues.  She was advised to return to the ER for new or worsening symptoms including but not limited to shortness of breath, chest pain, dizziness, lightheadedness, syncope.    Final diagnoses:   Episode of heavy vaginal bleeding       ED Disposition  ED Disposition     ED Disposition   Discharge    Condition   Stable    Comment   --             Teresa Madrid APRN  [address] [address] [phone]    Call   As needed    OBGYN    Schedule an appointment as soon as possible for a visit   for further outpatient evaluation of heavy vaginal bleeding    [facility] Emergency Department  [address] [facility] [address] [phone]  Go to   As needed, If symptoms worsen         Medication List      No changes were made to your prescriptions during this visit.          Elisabeth Witt PA-C  07/01/22 1958

## 2022-07-01 NOTE — DISCHARGE INSTRUCTIONS
Take a prenatal vitamin with iron. Drink orange juice or vitamin C to help with absorption of iron. Follow up with your OBGYN for further outpatient evaluation of heavy vaginal bleeding if symptoms persist. Return to the ER for new or worsening symptoms including but not limited to dizziness, lightheadedness, syncope, heart racing, low blood pressure, chest pain, shortness of breath.

## 2022-07-26 ENCOUNTER — OFFICE VISIT (OUTPATIENT)
Dept: OBSTETRICS AND GYNECOLOGY | Facility: CLINIC | Age: 32
End: 2022-07-26

## 2022-07-26 VITALS — BODY MASS INDEX: 22.63 KG/M2 | WEIGHT: 140.2 LBS | SYSTOLIC BLOOD PRESSURE: 142 MMHG | DIASTOLIC BLOOD PRESSURE: 84 MMHG

## 2022-07-26 DIAGNOSIS — Z30.2 ENCOUNTER FOR STERILIZATION: Primary | ICD-10-CM

## 2022-07-26 PROCEDURE — 99213 OFFICE O/P EST LOW 20 MIN: CPT | Performed by: OBSTETRICS & GYNECOLOGY

## 2022-07-26 RX ORDER — DROSPIRENONE 4 MG/1
4 TABLET, FILM COATED ORAL DAILY
COMMUNITY
End: 2022-08-23

## 2022-07-26 NOTE — PROGRESS NOTES
Subjective   Chief Complaint   Patient presents with   • Follow-up     Discuss tubal     Deeufemia Jay Jay Ramirez is a 32 y.o. year old  (C/S x 3).  Patient's last menstrual period was 2022 (approximate).  She presents to be seen because of desired BTL. .     OTHER COMPLAINTS:  Nothing else    The following portions of the patient's history were reviewed and updated as appropriate:  She  has a past medical history of Hepatitis C, Herpes, Infectious viral hepatitis, and Ovarian cyst.  She does not have any pertinent problems on file.  She  has a past surgical history that includes  section () and  section, low transverse ().  Her family history includes Diabetes in her mother; Thyroid disease in her mother.  She  reports that she has been smoking cigarettes. She has been smoking about 0.50 packs per day. She has never used smokeless tobacco. She reports previous drug use. She reports that she does not drink alcohol.  Current Outpatient Medications   Medication Sig Dispense Refill   • Drospirenone (Slynd) 4 MG tablet Take 4 mg by mouth Daily.     • Prenatal Vit-Fe Fumarate-FA (Prenatal ) 27-1 MG tablet tablet Take 1 tablet by mouth Daily.       No current facility-administered medications for this visit.     Current Outpatient Medications on File Prior to Visit   Medication Sig   • Drospirenone (Slynd) 4 MG tablet Take 4 mg by mouth Daily.   • Prenatal Vit-Fe Fumarate-FA (Prenatal ) 27-1 MG tablet tablet Take 1 tablet by mouth Daily.     No current facility-administered medications on file prior to visit.     She has No Known Allergies.    Social History    Tobacco Use      Smoking status: Current Every Day Smoker        Packs/day: 0.50        Types: Cigarettes      Smokeless tobacco: Never Used    Review of Systems  Consitutional POS: nothing reported    NEG: anorexia or night sweats   Gastointestinal POS: nothing reported    NEG: bloating, change in bowel habits, melena or reflux  symptoms   Genitourinary POS: nothing reported    NEG: dysuria or hematuria   Integument POS: nothing reported    NEG: moles that are changing in size, shape, color or rashes   Breast POS: nothing reported    NEG: persistent breast lump, skin dimpling or nipple discharge         Respiratory: negative  Cardiovascular: negative          Objective   /84   Wt 63.6 kg (140 lb 3.2 oz)   LMP 07/05/2022 (Approximate)   Breastfeeding No   BMI 22.63 kg/m²     General:  well developed; well nourished  no acute distress   Skin:  Not performed.   Thyroid: not examined   Lungs:  breathing is unlabored  clear to auscultation bilaterally   Heart:  regular rate and rhythm, S1, S2 normal, no murmur, click, rub or gallop   Breasts:  Not performed.   Abdomen: soft, non-tender; no masses  no umbilical or inguinal hernias are present  no hepato-splenomegaly   Pelvis: Not performed.     Psychiatric: Alert and oriented ×3, mood and affect appropriate  HEENT: Atraumatic, normocephalic, normal scleral icterus  Extremities: 2+ pulses bilaterally, no edema      Lab Review   No data reviewed    Imaging   No data reviewed        Assessment   1. Desired permanent sterilization     Plan   1. LPS BTL  2. R/B A    No orders of the defined types were placed in this encounter.         This note was electronically signed.      July 26, 2022

## 2022-07-27 PROBLEM — Z30.2 ENCOUNTER FOR STERILIZATION: Status: ACTIVE | Noted: 2022-07-27

## 2022-08-22 ENCOUNTER — TELEPHONE (OUTPATIENT)
Dept: PREADMISSION TESTING | Facility: HOSPITAL | Age: 32
End: 2022-08-22

## 2022-08-23 ENCOUNTER — PRE-ADMISSION TESTING (OUTPATIENT)
Dept: PREADMISSION TESTING | Facility: HOSPITAL | Age: 32
End: 2022-08-23

## 2022-08-23 VITALS — BODY MASS INDEX: 22.5 KG/M2 | HEIGHT: 66 IN | WEIGHT: 140 LBS

## 2022-08-23 DIAGNOSIS — Z30.2 ENCOUNTER FOR STERILIZATION: ICD-10-CM

## 2022-08-23 LAB
ALBUMIN SERPL-MCNC: 4.9 G/DL (ref 3.5–5.2)
ALBUMIN/GLOB SERPL: 1.6 G/DL
ALP SERPL-CCNC: 66 U/L (ref 39–117)
ALT SERPL W P-5'-P-CCNC: 10 U/L (ref 1–33)
ANION GAP SERPL CALCULATED.3IONS-SCNC: 10 MMOL/L (ref 5–15)
AST SERPL-CCNC: 14 U/L (ref 1–32)
B-HCG UR QL: NEGATIVE
BACTERIA UR QL AUTO: ABNORMAL /HPF
BILIRUB SERPL-MCNC: 0.2 MG/DL (ref 0–1.2)
BILIRUB UR QL STRIP: NEGATIVE
BUN SERPL-MCNC: 15 MG/DL (ref 6–20)
BUN/CREAT SERPL: 23.8 (ref 7–25)
CALCIUM SPEC-SCNC: 9.6 MG/DL (ref 8.6–10.5)
CHLORIDE SERPL-SCNC: 107 MMOL/L (ref 98–107)
CLARITY UR: CLEAR
CO2 SERPL-SCNC: 24 MMOL/L (ref 22–29)
COLOR UR: YELLOW
CREAT SERPL-MCNC: 0.63 MG/DL (ref 0.57–1)
EGFRCR SERPLBLD CKD-EPI 2021: 120.3 ML/MIN/1.73
GLOBULIN UR ELPH-MCNC: 3 GM/DL
GLUCOSE SERPL-MCNC: 87 MG/DL (ref 65–99)
GLUCOSE UR STRIP-MCNC: NEGATIVE MG/DL
HGB UR QL STRIP.AUTO: ABNORMAL
HYALINE CASTS UR QL AUTO: ABNORMAL /LPF
KETONES UR QL STRIP: NEGATIVE
LEUKOCYTE ESTERASE UR QL STRIP.AUTO: NEGATIVE
NITRITE UR QL STRIP: NEGATIVE
PH UR STRIP.AUTO: 6 [PH] (ref 5–8)
POTASSIUM SERPL-SCNC: 3.7 MMOL/L (ref 3.5–5.2)
PROT SERPL-MCNC: 7.9 G/DL (ref 6–8.5)
PROT UR QL STRIP: NEGATIVE
RBC # UR STRIP: ABNORMAL /HPF
REF LAB TEST METHOD: ABNORMAL
SODIUM SERPL-SCNC: 141 MMOL/L (ref 136–145)
SP GR UR STRIP: 1.02 (ref 1–1.03)
SQUAMOUS #/AREA URNS HPF: ABNORMAL /HPF
UROBILINOGEN UR QL STRIP: ABNORMAL
WBC # UR STRIP: ABNORMAL /HPF

## 2022-08-23 PROCEDURE — 81001 URINALYSIS AUTO W/SCOPE: CPT

## 2022-08-23 PROCEDURE — C9803 HOPD COVID-19 SPEC COLLECT: HCPCS

## 2022-08-23 PROCEDURE — 36415 COLL VENOUS BLD VENIPUNCTURE: CPT

## 2022-08-23 PROCEDURE — U0004 COV-19 TEST NON-CDC HGH THRU: HCPCS

## 2022-08-23 PROCEDURE — 85025 COMPLETE CBC W/AUTO DIFF WBC: CPT | Performed by: OBSTETRICS & GYNECOLOGY

## 2022-08-23 PROCEDURE — 80053 COMPREHEN METABOLIC PANEL: CPT

## 2022-08-23 PROCEDURE — 81025 URINE PREGNANCY TEST: CPT

## 2022-08-24 LAB — SARS-COV-2 RNA NOSE QL NAA+PROBE: NOT DETECTED

## 2022-08-25 NOTE — H&P
Chief Complaint   Patient presents with   • Follow-up       Discuss tubal      Demarshallalbania Ramirez is a 32 y.o. year old  (C/S x 3).  Patient's last menstrual period was 2022 (approximate).  She presents to be seen because of desired BTL. .      OTHER COMPLAINTS:  Nothing else     The following portions of the patient's history were reviewed and updated as appropriate:  She  has a past medical history of Hepatitis C, Herpes, Infectious viral hepatitis, and Ovarian cyst.  She does not have any pertinent problems on file.  She  has a past surgical history that includes  section () and  section, low transverse ().  Her family history includes Diabetes in her mother; Thyroid disease in her mother.  She  reports that she has been smoking cigarettes. She has been smoking about 0.50 packs per day. She has never used smokeless tobacco. She reports previous drug use. She reports that she does not drink alcohol.         Current Outpatient Medications   Medication Sig Dispense Refill   • Drospirenone (Slynd) 4 MG tablet Take 4 mg by mouth Daily.       • Prenatal Vit-Fe Fumarate-FA (Prenatal ) 27-1 MG tablet tablet Take 1 tablet by mouth Daily.          No current facility-administered medications for this visit.           Current Outpatient Medications on File Prior to Visit   Medication Sig   • Drospirenone (Slynd) 4 MG tablet Take 4 mg by mouth Daily.   • Prenatal Vit-Fe Fumarate-FA (Prenatal ) 27-1 MG tablet tablet Take 1 tablet by mouth Daily.      No current facility-administered medications on file prior to visit.      She has No Known Allergies.     Social History    Tobacco Use      Smoking status: Current Every Day Smoker        Packs/day: 0.50        Types: Cigarettes      Smokeless tobacco: Never Used     Review of Systems       Consitutional POS: nothing reported     NEG: anorexia or night sweats   Gastointestinal POS: nothing reported     NEG: bloating, change in bowel  habits, melena or reflux symptoms   Genitourinary POS: nothing reported     NEG: dysuria or hematuria   Integument POS: nothing reported     NEG: moles that are changing in size, shape, color or rashes   Breast POS: nothing reported     NEG: persistent breast lump, skin dimpling or nipple discharge            Respiratory: negative  Cardiovascular: negative                 Objective      /84   Wt 63.6 kg (140 lb 3.2 oz)   LMP 07/05/2022 (Approximate)   Breastfeeding No   BMI 22.63 kg/m²      General:  well developed; well nourished  no acute distress   Skin:  Not performed.   Thyroid: not examined   Lungs:  breathing is unlabored  clear to auscultation bilaterally   Heart:  regular rate and rhythm, S1, S2 normal, no murmur, click, rub or gallop   Breasts:  Not performed.   Abdomen: soft, non-tender; no masses  no umbilical or inguinal hernias are present  no hepato-splenomegaly   Pelvis: Not performed.      Psychiatric: Alert and oriented ×3, mood and affect appropriate  HEENT: Atraumatic, normocephalic, normal scleral icterus  Extremities: 2+ pulses bilaterally, no edema        Lab Review   No data reviewed     Imaging   No data reviewed               Assessment      1. Desired permanent sterilization           Plan      1. LPS BTL  2. R/B A

## 2022-08-26 ENCOUNTER — HOSPITAL ENCOUNTER (OUTPATIENT)
Facility: HOSPITAL | Age: 32
Setting detail: HOSPITAL OUTPATIENT SURGERY
Discharge: HOME | End: 2022-08-26
Attending: OBSTETRICS & GYNECOLOGY | Admitting: OBSTETRICS & GYNECOLOGY

## 2022-08-26 ENCOUNTER — ANESTHESIA EVENT (OUTPATIENT)
Dept: PERIOP | Facility: HOSPITAL | Age: 32
End: 2022-08-26

## 2022-08-26 ENCOUNTER — ANESTHESIA (OUTPATIENT)
Dept: PERIOP | Facility: HOSPITAL | Age: 32
End: 2022-08-26

## 2022-08-26 VITALS
DIASTOLIC BLOOD PRESSURE: 95 MMHG | RESPIRATION RATE: 16 BRPM | TEMPERATURE: 97.4 F | HEART RATE: 57 BPM | SYSTOLIC BLOOD PRESSURE: 138 MMHG | OXYGEN SATURATION: 100 %

## 2022-08-26 DIAGNOSIS — Z30.2 ENCOUNTER FOR STERILIZATION: Primary | ICD-10-CM

## 2022-08-26 PROCEDURE — 58671 LAPAROSCOPY TUBAL BLOCK: CPT | Performed by: OBSTETRICS & GYNECOLOGY

## 2022-08-26 PROCEDURE — 25010000002 ONDANSETRON PER 1 MG: Performed by: NURSE ANESTHETIST, CERTIFIED REGISTERED

## 2022-08-26 PROCEDURE — 25010000002 KETOROLAC TROMETHAMINE PER 15 MG: Performed by: NURSE ANESTHETIST, CERTIFIED REGISTERED

## 2022-08-26 PROCEDURE — 25010000002 HYDROMORPHONE 1 MG/ML SOLUTION: Performed by: NURSE ANESTHETIST, CERTIFIED REGISTERED

## 2022-08-26 PROCEDURE — 25010000002 SUCCINYLCHOLINE PER 20 MG: Performed by: NURSE ANESTHETIST, CERTIFIED REGISTERED

## 2022-08-26 PROCEDURE — 25010000002 HYDROMORPHONE PER 4 MG: Performed by: NURSE ANESTHETIST, CERTIFIED REGISTERED

## 2022-08-26 PROCEDURE — 25010000002 PROPOFOL 200 MG/20ML EMULSION: Performed by: NURSE ANESTHETIST, CERTIFIED REGISTERED

## 2022-08-26 PROCEDURE — 25010000002 DEXAMETHASONE PER 1 MG: Performed by: NURSE ANESTHETIST, CERTIFIED REGISTERED

## 2022-08-26 DEVICE — CLIP FALLOP FILSHIE TI PR STRL BX/20: Type: IMPLANTABLE DEVICE | Site: ABDOMEN | Status: FUNCTIONAL

## 2022-08-26 RX ORDER — ONDANSETRON 2 MG/ML
4 INJECTION INTRAMUSCULAR; INTRAVENOUS ONCE AS NEEDED
Status: DISCONTINUED | OUTPATIENT
Start: 2022-08-26 | End: 2022-08-26 | Stop reason: HOSPADM

## 2022-08-26 RX ORDER — HYDROCODONE BITARTRATE AND ACETAMINOPHEN 5; 325 MG/1; MG/1
1 TABLET ORAL ONCE AS NEEDED
Status: DISCONTINUED | OUTPATIENT
Start: 2022-08-26 | End: 2022-08-26 | Stop reason: HOSPADM

## 2022-08-26 RX ORDER — SODIUM CHLORIDE 0.9 % (FLUSH) 0.9 %
10 SYRINGE (ML) INJECTION AS NEEDED
Status: DISCONTINUED | OUTPATIENT
Start: 2022-08-26 | End: 2022-08-26 | Stop reason: HOSPADM

## 2022-08-26 RX ORDER — LORAZEPAM 2 MG/ML
1 INJECTION INTRAMUSCULAR ONCE AS NEEDED
Status: DISCONTINUED | OUTPATIENT
Start: 2022-08-26 | End: 2022-08-26 | Stop reason: HOSPADM

## 2022-08-26 RX ORDER — MEPERIDINE HYDROCHLORIDE 25 MG/ML
25 INJECTION INTRAMUSCULAR; INTRAVENOUS; SUBCUTANEOUS ONCE AS NEEDED
Status: DISCONTINUED | OUTPATIENT
Start: 2022-08-26 | End: 2022-08-26 | Stop reason: HOSPADM

## 2022-08-26 RX ORDER — DEXAMETHASONE SODIUM PHOSPHATE 4 MG/ML
INJECTION, SOLUTION INTRA-ARTICULAR; INTRALESIONAL; INTRAMUSCULAR; INTRAVENOUS; SOFT TISSUE AS NEEDED
Status: DISCONTINUED | OUTPATIENT
Start: 2022-08-26 | End: 2022-08-26 | Stop reason: SURG

## 2022-08-26 RX ORDER — ONDANSETRON 4 MG/1
4 TABLET, FILM COATED ORAL ONCE AS NEEDED
Status: DISCONTINUED | OUTPATIENT
Start: 2022-08-26 | End: 2022-08-26 | Stop reason: HOSPADM

## 2022-08-26 RX ORDER — HYDROCODONE BITARTRATE AND ACETAMINOPHEN 5; 325 MG/1; MG/1
1 TABLET ORAL EVERY 6 HOURS PRN
Qty: 5 TABLET | Refills: 0 | Status: SHIPPED | OUTPATIENT
Start: 2022-08-26 | End: 2022-09-02

## 2022-08-26 RX ORDER — MAGNESIUM HYDROXIDE 1200 MG/15ML
LIQUID ORAL AS NEEDED
Status: DISCONTINUED | OUTPATIENT
Start: 2022-08-26 | End: 2022-08-26 | Stop reason: HOSPADM

## 2022-08-26 RX ORDER — HYDROMORPHONE HCL 110MG/55ML
PATIENT CONTROLLED ANALGESIA SYRINGE INTRAVENOUS AS NEEDED
Status: DISCONTINUED | OUTPATIENT
Start: 2022-08-26 | End: 2022-08-26 | Stop reason: SURG

## 2022-08-26 RX ORDER — MORPHINE SULFATE 2 MG/ML
2 INJECTION, SOLUTION INTRAMUSCULAR; INTRAVENOUS
Status: DISCONTINUED | OUTPATIENT
Start: 2022-08-26 | End: 2022-08-26 | Stop reason: HOSPADM

## 2022-08-26 RX ORDER — SODIUM CHLORIDE, SODIUM LACTATE, POTASSIUM CHLORIDE, CALCIUM CHLORIDE 600; 310; 30; 20 MG/100ML; MG/100ML; MG/100ML; MG/100ML
1000 INJECTION, SOLUTION INTRAVENOUS CONTINUOUS
Status: DISCONTINUED | OUTPATIENT
Start: 2022-08-26 | End: 2022-08-26 | Stop reason: HOSPADM

## 2022-08-26 RX ORDER — IBUPROFEN 600 MG/1
600 TABLET ORAL EVERY 6 HOURS PRN
Status: DISCONTINUED | OUTPATIENT
Start: 2022-08-26 | End: 2022-08-26 | Stop reason: HOSPADM

## 2022-08-26 RX ORDER — IBUPROFEN 600 MG/1
600 TABLET ORAL EVERY 6 HOURS PRN
Qty: 30 TABLET | Refills: 1 | Status: SHIPPED | OUTPATIENT
Start: 2022-08-26 | End: 2023-02-15 | Stop reason: HOSPADM

## 2022-08-26 RX ORDER — PROPOFOL 10 MG/ML
INJECTION, EMULSION INTRAVENOUS AS NEEDED
Status: DISCONTINUED | OUTPATIENT
Start: 2022-08-26 | End: 2022-08-26 | Stop reason: SURG

## 2022-08-26 RX ORDER — ROCURONIUM BROMIDE 10 MG/ML
INJECTION, SOLUTION INTRAVENOUS AS NEEDED
Status: DISCONTINUED | OUTPATIENT
Start: 2022-08-26 | End: 2022-08-26 | Stop reason: SURG

## 2022-08-26 RX ORDER — LIDOCAINE HYDROCHLORIDE 20 MG/ML
INJECTION, SOLUTION INTRAVENOUS AS NEEDED
Status: DISCONTINUED | OUTPATIENT
Start: 2022-08-26 | End: 2022-08-26 | Stop reason: SURG

## 2022-08-26 RX ORDER — KETOROLAC TROMETHAMINE 30 MG/ML
INJECTION, SOLUTION INTRAMUSCULAR; INTRAVENOUS AS NEEDED
Status: DISCONTINUED | OUTPATIENT
Start: 2022-08-26 | End: 2022-08-26 | Stop reason: SURG

## 2022-08-26 RX ORDER — ONDANSETRON 2 MG/ML
INJECTION INTRAMUSCULAR; INTRAVENOUS AS NEEDED
Status: DISCONTINUED | OUTPATIENT
Start: 2022-08-26 | End: 2022-08-26 | Stop reason: SURG

## 2022-08-26 RX ORDER — SUCCINYLCHOLINE CHLORIDE 20 MG/ML
INJECTION INTRAMUSCULAR; INTRAVENOUS AS NEEDED
Status: DISCONTINUED | OUTPATIENT
Start: 2022-08-26 | End: 2022-08-26 | Stop reason: SURG

## 2022-08-26 RX ADMIN — ONDANSETRON 4 MG: 2 SOLUTION INTRAMUSCULAR; INTRAVENOUS at 07:26

## 2022-08-26 RX ADMIN — ROCURONIUM BROMIDE 30 MG: 10 INJECTION, SOLUTION INTRAVENOUS at 07:26

## 2022-08-26 RX ADMIN — HYDROMORPHONE HYDROCHLORIDE 0.5 MG: 1 INJECTION, SOLUTION INTRAMUSCULAR; INTRAVENOUS; SUBCUTANEOUS at 08:09

## 2022-08-26 RX ADMIN — SUGAMMADEX 200 MG: 100 INJECTION, SOLUTION INTRAVENOUS at 07:47

## 2022-08-26 RX ADMIN — HYDROMORPHONE HYDROCHLORIDE 0.5 MG: 1 INJECTION, SOLUTION INTRAMUSCULAR; INTRAVENOUS; SUBCUTANEOUS at 08:25

## 2022-08-26 RX ADMIN — PROPOFOL 200 MG: 10 INJECTION, EMULSION INTRAVENOUS at 07:26

## 2022-08-26 RX ADMIN — DEXAMETHASONE SODIUM PHOSPHATE 4 MG: 4 INJECTION, SOLUTION INTRA-ARTICULAR; INTRALESIONAL; INTRAMUSCULAR; INTRAVENOUS; SOFT TISSUE at 07:26

## 2022-08-26 RX ADMIN — SUCCINYLCHOLINE CHLORIDE 100 MG: 20 INJECTION, SOLUTION INTRAMUSCULAR; INTRAVENOUS at 07:26

## 2022-08-26 RX ADMIN — SODIUM CHLORIDE, POTASSIUM CHLORIDE, SODIUM LACTATE AND CALCIUM CHLORIDE 1000 ML: 600; 310; 30; 20 INJECTION, SOLUTION INTRAVENOUS at 07:04

## 2022-08-26 RX ADMIN — HYDROMORPHONE HYDROCHLORIDE 2 MG: 2 INJECTION INTRAMUSCULAR; INTRAVENOUS; SUBCUTANEOUS at 07:30

## 2022-08-26 RX ADMIN — LIDOCAINE HYDROCHLORIDE 60 MG: 20 INJECTION, SOLUTION INTRAVENOUS at 07:26

## 2022-08-26 RX ADMIN — KETOROLAC TROMETHAMINE 15 MG: 30 INJECTION, SOLUTION INTRAMUSCULAR at 08:02

## 2022-08-26 NOTE — ANESTHESIA PREPROCEDURE EVALUATION
Anesthesia Evaluation     Patient summary reviewed and Nursing notes reviewed   no history of anesthetic complications:  NPO Solid Status: > 8 hours  NPO Liquid Status: > 8 hours           Airway   Mallampati: II  TM distance: >3 FB  Neck ROM: full  No difficulty expected  Dental - normal exam     Pulmonary - normal exam   (+) a smoker Current,   Cardiovascular - negative cardio ROS and normal exam  Exercise tolerance: good (4-7 METS)        Neuro/Psych- negative ROS  GI/Hepatic/Renal/Endo    (+)   hepatitis C, liver disease,     Musculoskeletal     Abdominal    Substance History      OB/GYN          Other - negative ROS       ROS/Med Hx Other: 12.6/39.3  K 3.7                  Anesthesia Plan    ASA 2     general     (Risks and benefits discussed including risk of aspiration, recall and dental damage. All patient questions answered. Will continue with POC.)  intravenous induction     Anesthetic plan, risks, benefits, and alternatives have been provided, discussed and informed consent has been obtained with: patient.    Plan discussed with CRNA.        CODE STATUS:

## 2022-08-26 NOTE — OP NOTE
Asher Ramirez  : 1990  MRN: [id number]  CSN: [id number]  Date: 2022    Operative Report    TUBAL FALLOPE FILSHIE CLIPPING LAPAROSCOPIC      Pre-op Diagnosis:  Encounter for sterilization [Z30.2]   Post-op Diagnosis:  Post-Op Diagnosis Codes:     * Encounter for sterilization [Z30.2]   Procedure: Procedure(s):  TUBAL FALLOPE FILSHIE CLIPPING LAPAROSCOPIC   Surgeon: AGA Beckett M.D.   Assist: staff   Anesthesia: General   Estimated Blood Loss: <5 mls   ABx: none   Specimens:  none   Findings:  Mild adhesions anteriorly at the junction of the anterior peritoneal surface and anterior uterine surface there are no significant dense adhesions.  Free adnexa normal in size and shape.  Normal upper GI viscera.  Normal posterior cul-de-sac.   Complications: none   Indications:  Desire permanent sterilization     Description of Procedure:  After the appropriate time out and after adequate dosing of her anesthesia, the patient was prepped and draped in the usual sterile fashion.  A tang catheter had been placed in the bladder for drainage during the procedure.  She was placed in the dorsal lithotomy position using Jermaine stirrups.  A weighted speculum was placed in the vagina.  The anterior lip of the cervix was grasped with a single tooth tenaculum.  An acorn cannula was placed in the cervix and used for manipulation during the procedure.  A 2 cm infraumbilical skin incision was made with a knife.  A 10 mm trocar was inserted under direct visualization using the Optiview without any complications.  The abdomen was insufflated with CO2 making sure to keep the pressure less than 15 mmHg.  The patient was placed in the Trendelenburg position.  An ancillary 8 mm trocar was placed supra-pubically 3-4 finger-breadths above the symphysis pubis under direct visualization without any complications.  The pelvis was explored with the above findings noted.  The left fallopian tube was identified by its  fimbriated end.  Filshie clips x2 were placed bilaterally in close approximation on each tube transverse to the perpendicular axis of the isthmic portion of the tube.  Repeat inspection revealed clip placement was intact.  The suprapubic trocar was removed.  The abdomen was released of CO2.  Inspection revealed adequate hemostasis and no fluid in the surgical field.  The umbilical port was removed as well.  The skin incisions were noted to be hemostatic with minimal bovie electrocautery.  The skin was closed with 4-0 nylon in an interrupted fashion.  The cervical tenaculum was removed and the cervix was noted to be hemostatic.  The patient tolerated the procedure well.  There were no complications.  All instruments and sponge counts were correct at the end of the procedure.  She was taken to postoperative recovery room in stable condition.      AGA Beckett M.D.  8/26/2022  07:50 EDT

## 2022-08-26 NOTE — ANESTHESIA POSTPROCEDURE EVALUATION
Patient: Juvencio Ramirez    Procedure Summary     Date: 08/26/22 Room / Location: [facility] OR 2 /  [facility]    Anesthesia Start: 0713 Anesthesia Stop: 0802    Procedure: TUBAL FALLOPE FILSHIE CLIPPING LAPAROSCOPIC (Bilateral Abdomen) Diagnosis:       Encounter for sterilization      (Encounter for sterilization [Z30.2])    Surgeons: Chandan Beckett MD Provider: Saulo Israel CRNA    Anesthesia Type: general ASA Status: 2          Anesthesia Type: general    Vitals  Vitals Value Taken Time   /121 08/26/22 0759   Temp     Pulse 86 08/26/22 0802   Resp     SpO2 100 % 08/26/22 0802   Vitals shown include unvalidated device data.        Post Anesthesia Care and Evaluation    Patient location during evaluation: PACU  Patient participation: complete - patient participated  Level of consciousness: awake  Pain score: 3  Pain management: adequate    Airway patency: patent  Anesthetic complications: No anesthetic complications  PONV Status: controlled  Cardiovascular status: acceptable and stable  Respiratory status: acceptable and face mask  Hydration status: acceptable    Comments: SEE NURSING NOTES FOR POST OP VITAL SIGNS

## 2022-08-26 NOTE — DISCHARGE INSTRUCTIONS
No pushing, pulling, tugging,  heavy lifting, or strenuous activity.  No major decision making, driving, or drinking alcoholic beverages for 24 hours. ( due to the medications you have  received)  Always use good hand hygiene/washing techniques.  NO driving while taking pain medications.    * if you have an incision:  Check your incision area every day for signs of infection.   Check for:  * more redness, swelling, or pain  *more fluid or blood  *warmth  *pus or bad smell     Rest today    To assist you in voiding:  Drink plenty of fluids  Listen to running water while attempting to void.    If you are unable to urinate and you have an uncomfortable urge to void or it has been   6 hours since you were discharged, return to the Emergency Room     Pelvic rest is best described as not putting anything in your vagina. This includes tampons, douching, tub bathing or sexual activity.

## 2022-08-26 NOTE — ANESTHESIA PROCEDURE NOTES
Airway  Urgency: elective    Date/Time: 8/26/2022 7:26 AM  Airway not difficult    General Information and Staff    Patient location during procedure: OR  CRNA/CAA: Saulo Israel CRNA    Indications and Patient Condition  Indications for airway management: airway protection    Preoxygenated: yes  Mask difficulty assessment: 1 - vent by mask    Final Airway Details  Final airway type: endotracheal airway      Successful airway: ETT  Cuffed: yes   Successful intubation technique: direct laryngoscopy  Endotracheal tube insertion site: oral  Blade: Lauren  Blade size: 3  ETT size (mm): 7.0  Cormack-Lehane Classification: grade I - full view of glottis  Placement verified by: chest auscultation and capnometry   Measured from: lips  ETT/EBT  to lips (cm): 21  Number of attempts at approach: 1  Assessment: lips, teeth, and gum same as pre-op and atraumatic intubation

## 2022-09-02 ENCOUNTER — OFFICE VISIT (OUTPATIENT)
Dept: OBSTETRICS AND GYNECOLOGY | Facility: CLINIC | Age: 32
End: 2022-09-02

## 2022-09-02 VITALS
HEIGHT: 66 IN | BODY MASS INDEX: 22.34 KG/M2 | SYSTOLIC BLOOD PRESSURE: 114 MMHG | DIASTOLIC BLOOD PRESSURE: 72 MMHG | WEIGHT: 139 LBS

## 2022-09-02 DIAGNOSIS — Z09 POSTOP CHECK: Primary | ICD-10-CM

## 2022-09-02 PROCEDURE — 99024 POSTOP FOLLOW-UP VISIT: CPT | Performed by: MIDWIFE

## 2022-09-02 NOTE — PROGRESS NOTES
"Subjective   Chief Complaint   Patient presents with   • Post-op     7 days post op tubal, right side pain that hurts into shoulder.     Juvencio Ramirez is a 32 y.o. year old  presenting to be seen for her post-operative visit.  Currently she reports no problems with eating, bowel movements, voiding, or wound drainage and pain is well controlled.    There was no pathology result associated with Juvencio's recent procedure.      OTHER COMPLAINTS:  She has been having some right side and shoulder pain. It hurts worse when she is lying down. She is passing flatus and has had a bowel movement. She is taking Mylicon.       Objective   /72   Ht 167.6 cm (66\")   Wt 63 kg (139 lb)   BMI 22.44 kg/m²     General:  well developed; well nourished  no acute distress   Abdomen: soft, non-tender; no masses  incision is clean, dry, intact, without drainage and x 2   Pelvis: Not performed.          Assessment   1. S/P BTL with Filshie clips     Plan   1. May return to full activity with no restrictions  2. Follow up PRN     No orders of the defined types were placed in this encounter.         This note was electronically signed.  Shelby David, APRN  2022  "

## 2022-09-21 ENCOUNTER — OFFICE VISIT (OUTPATIENT)
Dept: OBSTETRICS AND GYNECOLOGY | Facility: CLINIC | Age: 32
End: 2022-09-21

## 2022-09-21 VITALS — WEIGHT: 136.4 LBS | DIASTOLIC BLOOD PRESSURE: 68 MMHG | SYSTOLIC BLOOD PRESSURE: 110 MMHG | BODY MASS INDEX: 22.02 KG/M2

## 2022-09-21 DIAGNOSIS — N93.9 ABNORMAL UTERINE BLEEDING (AUB): ICD-10-CM

## 2022-09-21 DIAGNOSIS — R10.2 PELVIC PAIN: Primary | ICD-10-CM

## 2022-09-21 PROCEDURE — 99213 OFFICE O/P EST LOW 20 MIN: CPT | Performed by: OBSTETRICS & GYNECOLOGY

## 2022-09-21 NOTE — PROGRESS NOTES
Subjective   Chief Complaint   Patient presents with   • Abdominal Pain     Patient complains of right side abdominal pain since tubal done 3 weeks ago. TVS done today.     Juvencio Ramirez is a 32 y.o. year old .  No LMP recorded. (Menstrual status: Tubal ligation).  She presents to be seen because of  .   CO RLQ pain sharp with cramps since BTL--was on OCP before BTL for bleeding for the past 2 months beforehand--bleeding did not imporve with OCP.  Patient's been intolerant to hormonal management in the past.  She is been on numerous OCPs as well as Depo-Provera for quite some time ago-after the Depo she had to have a D&C for irregular bleeding.  Op report reviewed no endometriosis noted minor adhesions from her 3 prior C-sections but no dense adhesions.  Ultrasound as noted below shows no masses endometrium is thin uterus is not large.  No fibroids.  Normal ovaries.  OTHER COMPLAINTS:  Nothing else    The following portions of the patient's history were reviewed and updated as appropriate:  She  has a past medical history of COVID-19 vaccine series completed, Hepatitis C, Herpes, Infectious viral hepatitis, Ovarian cyst, and Wears glasses.  She does not have any pertinent problems on file.  She  has a past surgical history that includes  section ();  section, low transverse (); and Tubal Sterilization (Bilateral, 2022).  Her family history includes Diabetes in her mother; Thyroid disease in her mother.  She  reports that she has been smoking cigarettes. She has been smoking about 0.25 packs per day. She has never used smokeless tobacco. She reports previous drug use. She reports that she does not drink alcohol.  Current Outpatient Medications   Medication Sig Dispense Refill   • ibuprofen (ADVIL,MOTRIN) 600 MG tablet Take 1 tablet by mouth Every 6 (Six) Hours As Needed for Moderate Pain . 30 tablet 1   • Prenatal Vit-Fe Fumarate-FA (Prenatal ) 27-1 MG tablet tablet Take  1 tablet by mouth Daily.       No current facility-administered medications for this visit.     Current Outpatient Medications on File Prior to Visit   Medication Sig   • ibuprofen (ADVIL,MOTRIN) 600 MG tablet Take 1 tablet by mouth Every 6 (Six) Hours As Needed for Moderate Pain .   • Prenatal Vit-Fe Fumarate-FA (Prenatal 27-1) 27-1 MG tablet tablet Take 1 tablet by mouth Daily.     No current facility-administered medications on file prior to visit.     She has No Known Allergies.    Social History    Tobacco Use      Smoking status: Current Every Day Smoker        Packs/day: 0.25        Types: Cigarettes      Smokeless tobacco: Never Used    Review of Systems  Consitutional POS: nothing reported    NEG: anorexia or night sweats   Gastointestinal POS: nothing reported    NEG: bloating, change in bowel habits, melena or reflux symptoms   Genitourinary POS: nothing reported    NEG: dysuria or hematuria   Integument POS: nothing reported    NEG: moles that are changing in size, shape, color or rashes   Breast POS: nothing reported    NEG: persistent breast lump, skin dimpling or nipple discharge         Pertinent items are noted in HPI.          Objective   /68   Wt 61.9 kg (136 lb 6.4 oz)   Breastfeeding No   BMI 22.02 kg/m²     General:  well developed; well nourished  no acute distress   Skin:  Not performed.   Thyroid: not examined   Lungs:  breathing is unlabored   Heart:  Not performed.   Breasts:  Not performed.   Abdomen: soft, non-tender; no masses  no umbilical or inguinal hernias are present  no hepato-splenomegaly  incision is healed   Pelvis: Not performed.     Psychiatric: Alert and oriented ×3, mood and affect appropriate  HEENT: Atraumatic, normocephalic, normal scleral icterus  Extremities: 2+ pulses bilaterally, no edema      Lab Review   No data reviewed    Imaging   Pelvic ultrasound images independantly reviewed - Uterus is anteverted normal size and shape.  Endometrium 6 and half  millimeters.  Normal adnexa with small masses.  Small amount of free fluid.        Assessment   1. Lower abdominal/right lower quadrant pain.  This is really a longstanding issue.  She had a tubal ligation 3 and half weeks ago and the incisions are healed.  Operative report reviewed.  There was a minor amount of adhesions nothing significant.  No endometriosis.  2. Menometrorrhagia.  Patient is failed numerous forms of medical management.     Plan   1. As patient is 4 months from delivery only 3 months from tubal would like to give observation for 4 to 6 months in light of a normal laparoscopic report and normal ultrasound today.  Patient is to follow-up in about 6 months if symptoms persist and if they do at that time we will probably have to proceed with laparoscopic hysterectomy.  2.     No orders of the defined types were placed in this encounter.         This note was electronically signed.      September 21, 2022

## 2022-10-31 ENCOUNTER — TELEPHONE (OUTPATIENT)
Dept: PHARMACY | Facility: HOSPITAL | Age: 32
End: 2022-10-31

## 2022-11-23 ENCOUNTER — OFFICE VISIT (OUTPATIENT)
Dept: OBSTETRICS AND GYNECOLOGY | Facility: CLINIC | Age: 32
End: 2022-11-23

## 2022-11-23 VITALS
DIASTOLIC BLOOD PRESSURE: 68 MMHG | WEIGHT: 136 LBS | HEIGHT: 66 IN | BODY MASS INDEX: 21.86 KG/M2 | SYSTOLIC BLOOD PRESSURE: 110 MMHG

## 2022-11-23 DIAGNOSIS — N92.1 MENORRHAGIA WITH IRREGULAR CYCLE: Primary | ICD-10-CM

## 2022-11-23 PROCEDURE — 99214 OFFICE O/P EST MOD 30 MIN: CPT | Performed by: OBSTETRICS & GYNECOLOGY

## 2022-11-23 RX ORDER — SODIUM CHLORIDE 0.9 % (FLUSH) 0.9 %
10 SYRINGE (ML) INJECTION AS NEEDED
Status: CANCELLED | OUTPATIENT
Start: 2022-11-23

## 2022-11-23 RX ORDER — SODIUM CHLORIDE 9 MG/ML
40 INJECTION, SOLUTION INTRAVENOUS AS NEEDED
Status: CANCELLED | OUTPATIENT
Start: 2022-11-23

## 2022-11-23 RX ORDER — SODIUM CHLORIDE 0.9 % (FLUSH) 0.9 %
10 SYRINGE (ML) INJECTION EVERY 12 HOURS SCHEDULED
Status: CANCELLED | OUTPATIENT
Start: 2022-11-23

## 2022-11-23 RX ORDER — PHENAZOPYRIDINE HYDROCHLORIDE 100 MG/1
200 TABLET, FILM COATED ORAL ONCE
Status: CANCELLED | OUTPATIENT
Start: 2022-11-23 | End: 2022-11-23

## 2022-11-23 NOTE — PROGRESS NOTES
Subjective   Chief Complaint   Patient presents with   • Follow-up     Her cycle has been spotty and off cycle, her pcp put her on iron supplements      Juvencio Ramirez is a 32 y.o. year old .  No LMP recorded. (Menstrual status: Tubal ligation).  She presents to be seen because of persistent AUB, patient has been intolerant to homronal mgmt in the past and has tried NORI, Depo, IUD, Was on Slynda but had persistent spotting with this  ( took it for 3 months after last delivery and up to BTL 22934)  .     OTHER COMPLAINTS:  Nothing else    The following portions of the patient's history were reviewed and updated as appropriate:  She  has a past medical history of COVID-19 vaccine series completed, Hepatitis C, Herpes, Infectious viral hepatitis, Ovarian cyst, and Wears glasses.  She does not have any pertinent problems on file.  She  has a past surgical history that includes  section ();  section, low transverse (); and Tubal Sterilization (Bilateral, 2022).  Her family history includes Diabetes in her mother; Thyroid disease in her mother.  She  reports that she has been smoking cigarettes. She has been smoking an average of .25 packs per day. She has never used smokeless tobacco. She reports that she does not currently use drugs. She reports that she does not drink alcohol.  Current Outpatient Medications   Medication Sig Dispense Refill   • ibuprofen (ADVIL,MOTRIN) 600 MG tablet Take 1 tablet by mouth Every 6 (Six) Hours As Needed for Moderate Pain . 30 tablet 1   • Prenatal Vit-Fe Fumarate-FA (Prenatal ) 27-1 MG tablet tablet Take 1 tablet by mouth Daily.       No current facility-administered medications for this visit.     Current Outpatient Medications on File Prior to Visit   Medication Sig   • ibuprofen (ADVIL,MOTRIN) 600 MG tablet Take 1 tablet by mouth Every 6 (Six) Hours As Needed for Moderate Pain .   • Prenatal Vit-Fe Fumarate-FA (Prenatal 27-) 27-1 MG tablet  "tablet Take 1 tablet by mouth Daily.     No current facility-administered medications on file prior to visit.     She has No Known Allergies.    Social History    Tobacco Use      Smoking status: Every Day        Packs/day: 0.25        Types: Cigarettes      Smokeless tobacco: Never    Review of Systems  Consitutional POS: nothing reported    NEG: anorexia or night sweats   Gastointestinal POS: nothing reported    NEG: bloating, change in bowel habits, melena or reflux symptoms   Genitourinary POS: nothing reported    NEG: dysuria or hematuria   Integument POS: nothing reported    NEG: moles that are changing in size, shape, color or rashes   Breast POS: nothing reported    NEG: persistent breast lump, skin dimpling or nipple discharge         Respiratory: negative  Cardiovascular: negative          Objective   /68   Ht 167.6 cm (66\")   Wt 61.7 kg (136 lb)   BMI 21.95 kg/m²     General:  well developed; well nourished  no acute distress   Skin:  No suspicious lesions seen   Thyroid: not examined   Lungs:  breathing is unlabored  clear to auscultation bilaterally   Heart:  regular rate and rhythm, S1, S2 normal, no murmur, click, rub or gallop   Breasts:  Not performed.   Abdomen: soft, non-tender; no masses  no umbilical or inguinal hernias are present  no hepato-splenomegaly   Pelvis: Not performed.     Psychiatric: Alert and oriented ×3, mood and affect appropriate  HEENT: Atraumatic, normocephalic, normal scleral icterus  Extremities: 2+ pulses bilaterally, no edema      Lab Review   No data reviewed    Imaging   Pelvic ultrasound report        Assessment   1. Metrorrhagia with resultant anemia: Options discussed with patient including medical management-though she has been intolerant to many forms of these as noted above in the past, ablation, definitive management form of TLH.  Patient is desirous of definitive management and I think this is reasonable given her history as well as having her tubes " tied     Plan   1. Total laparoscopic hysterectomy/cystoscopy/possible open  2. R/B A    No orders of the defined types were placed in this encounter.         This note was electronically signed.      November 23, 2022

## 2023-02-03 ENCOUNTER — PRE-ADMISSION TESTING (OUTPATIENT)
Dept: PREADMISSION TESTING | Facility: HOSPITAL | Age: 33
End: 2023-02-03
Payer: COMMERCIAL

## 2023-02-03 VITALS — BODY MASS INDEX: 22.66 KG/M2 | HEIGHT: 66 IN | WEIGHT: 141 LBS

## 2023-02-03 DIAGNOSIS — N92.1 MENORRHAGIA WITH IRREGULAR CYCLE: ICD-10-CM

## 2023-02-03 LAB
BACTERIA UR QL AUTO: ABNORMAL /HPF
BILIRUB UR QL STRIP: NEGATIVE
CLARITY UR: CLEAR
COLOR UR: YELLOW
GLUCOSE UR STRIP-MCNC: NEGATIVE MG/DL
HGB UR QL STRIP.AUTO: ABNORMAL
HYALINE CASTS UR QL AUTO: ABNORMAL /LPF
KETONES UR QL STRIP: NEGATIVE
LEUKOCYTE ESTERASE UR QL STRIP.AUTO: NEGATIVE
NITRITE UR QL STRIP: NEGATIVE
PH UR STRIP.AUTO: 6.5 [PH] (ref 5–8)
PROT UR QL STRIP: NEGATIVE
RBC # UR STRIP: ABNORMAL /HPF
REF LAB TEST METHOD: ABNORMAL
SP GR UR STRIP: 1.02 (ref 1–1.03)
SQUAMOUS #/AREA URNS HPF: ABNORMAL /HPF
UROBILINOGEN UR QL STRIP: ABNORMAL
WBC # UR STRIP: ABNORMAL /HPF

## 2023-02-03 PROCEDURE — 85025 COMPLETE CBC W/AUTO DIFF WBC: CPT | Performed by: OBSTETRICS & GYNECOLOGY

## 2023-02-03 PROCEDURE — 85610 PROTHROMBIN TIME: CPT | Performed by: OBSTETRICS & GYNECOLOGY

## 2023-02-03 PROCEDURE — 85730 THROMBOPLASTIN TIME PARTIAL: CPT | Performed by: OBSTETRICS & GYNECOLOGY

## 2023-02-03 PROCEDURE — 93005 ELECTROCARDIOGRAM TRACING: CPT

## 2023-02-03 PROCEDURE — 80053 COMPREHEN METABOLIC PANEL: CPT | Performed by: OBSTETRICS & GYNECOLOGY

## 2023-02-03 PROCEDURE — 81001 URINALYSIS AUTO W/SCOPE: CPT

## 2023-02-05 LAB
QT INTERVAL: 400 MS
QTC INTERVAL: 425 MS

## 2023-02-13 NOTE — H&P
Chief Complaint   Patient presents with   • Follow-up       Her cycle has been spotty and off cycle, her pcp put her on iron supplements       Juvencio Ramirez is a 32 y.o. year old .  No LMP recorded. (Menstrual status: Tubal ligation).  She presents to be seen because of persistent AUB, patient has been intolerant to homronal mgmt in the past and has tried NORI, Depo, IUD, Was on Slynda but had persistent spotting with this  ( took it for 3 months after last delivery and up to BTL [address])  .      OTHER COMPLAINTS:  Nothing else     The following portions of the patient's history were reviewed and updated as appropriate:  She  has a past medical history of COVID-19 vaccine series completed, Hepatitis C, Herpes, Infectious viral hepatitis, Ovarian cyst, and Wears glasses.  She does not have any pertinent problems on file.  She  has a past surgical history that includes  section ();  section, low transverse (); and Tubal Sterilization (Bilateral, 2022).  Her family history includes Diabetes in her mother; Thyroid disease in her mother.  She  reports that she has been smoking cigarettes. She has been smoking an average of .25 packs per day. She has never used smokeless tobacco. She reports that she does not currently use drugs. She reports that she does not drink alcohol.         Current Outpatient Medications   Medication Sig Dispense Refill   • ibuprofen (ADVIL,MOTRIN) 600 MG tablet Take 1 tablet by mouth Every 6 (Six) Hours As Needed for Moderate Pain . 30 tablet 1   • Prenatal Vit-Fe Fumarate-FA (Prenatal ) 27-1 MG tablet tablet Take 1 tablet by mouth Daily.          No current facility-administered medications for this visit.           Current Outpatient Medications on File Prior to Visit   Medication Sig   • ibuprofen (ADVIL,MOTRIN) 600 MG tablet Take 1 tablet by mouth Every 6 (Six) Hours As Needed for Moderate Pain .   • Prenatal Vit-Fe Fumarate-FA (Prenatal ) 27-1  "MG tablet tablet Take 1 tablet by mouth Daily.      No current facility-administered medications on file prior to visit.      She has No Known Allergies.     Social History    Tobacco Use      Smoking status: Every Day        Packs/day: 0.25        Types: Cigarettes      Smokeless tobacco: Never     Review of Systems       Consitutional POS: nothing reported     NEG: anorexia or night sweats   Gastointestinal POS: nothing reported     NEG: bloating, change in bowel habits, melena or reflux symptoms   Genitourinary POS: nothing reported     NEG: dysuria or hematuria   Integument POS: nothing reported     NEG: moles that are changing in size, shape, color or rashes   Breast POS: nothing reported     NEG: persistent breast lump, skin dimpling or nipple discharge            Respiratory: negative  Cardiovascular: negative                 Objective      /68   Ht 167.6 cm (66\")   Wt 61.7 kg (136 lb)   BMI 21.95 kg/m²      General:  well developed; well nourished  no acute distress   Skin:  No suspicious lesions seen   Thyroid: not examined   Lungs:  breathing is unlabored  clear to auscultation bilaterally   Heart:  regular rate and rhythm, S1, S2 normal, no murmur, click, rub or gallop   Breasts:  Not performed.   Abdomen: soft, non-tender; no masses  no umbilical or inguinal hernias are present  no hepato-splenomegaly   Pelvis: Not performed.      Psychiatric: Alert and oriented ×3, mood and affect appropriate  HEENT: Atraumatic, normocephalic, normal scleral icterus  Extremities: 2+ pulses bilaterally, no edema        Lab Review   No data reviewed     Imaging   Pelvic ultrasound report               Assessment      1. Metrorrhagia with resultant anemia: Options discussed with patient including medical management-though she has been intolerant to many forms of these as noted above in the past, ablation, definitive management form of TLH.  Patient is desirous of definitive management and I think this is " reasonable given her history as well as having her tubes tied           Plan      1. Total laparoscopic hysterectomy/cystoscopy/possible open  2. R/B A

## 2023-02-14 ENCOUNTER — ANESTHESIA EVENT (OUTPATIENT)
Dept: PERIOP | Facility: HOSPITAL | Age: 33
End: 2023-02-14
Payer: COMMERCIAL

## 2023-02-14 ENCOUNTER — ANESTHESIA (OUTPATIENT)
Dept: PERIOP | Facility: HOSPITAL | Age: 33
End: 2023-02-14
Payer: COMMERCIAL

## 2023-02-14 ENCOUNTER — HOSPITAL ENCOUNTER (OUTPATIENT)
Facility: HOSPITAL | Age: 33
Discharge: HOME | End: 2023-02-15
Attending: OBSTETRICS & GYNECOLOGY | Admitting: OBSTETRICS & GYNECOLOGY
Payer: COMMERCIAL

## 2023-02-14 DIAGNOSIS — N92.1 MENORRHAGIA WITH IRREGULAR CYCLE: ICD-10-CM

## 2023-02-14 LAB
ABO GROUP BLD: NORMAL
B-HCG UR QL: NEGATIVE
BLD GP AB SCN SERPL QL: NEGATIVE
EXPIRATION DATE: NORMAL
INTERNAL NEGATIVE CONTROL: NEGATIVE
INTERNAL POSITIVE CONTROL: NORMAL
Lab: NORMAL
RH BLD: POSITIVE
T&S EXPIRATION DATE: NORMAL

## 2023-02-14 PROCEDURE — 25010000002 DEXAMETHASONE PER 1 MG: Performed by: NURSE ANESTHETIST, CERTIFIED REGISTERED

## 2023-02-14 PROCEDURE — 25010000002 SUCCINYLCHOLINE PER 20 MG: Performed by: NURSE ANESTHETIST, CERTIFIED REGISTERED

## 2023-02-14 PROCEDURE — G0378 HOSPITAL OBSERVATION PER HR: HCPCS

## 2023-02-14 PROCEDURE — 86901 BLOOD TYPING SEROLOGIC RH(D): CPT | Performed by: OBSTETRICS & GYNECOLOGY

## 2023-02-14 PROCEDURE — 25010000002 FENTANYL CITRATE (PF) 100 MCG/2ML SOLUTION: Performed by: NURSE ANESTHETIST, CERTIFIED REGISTERED

## 2023-02-14 PROCEDURE — 0 CEFAZOLIN SODIUM-DEXTROSE 2-3 GM-%(50ML) RECONSTITUTED SOLUTION: Performed by: OBSTETRICS & GYNECOLOGY

## 2023-02-14 PROCEDURE — 81025 URINE PREGNANCY TEST: CPT | Performed by: OBSTETRICS & GYNECOLOGY

## 2023-02-14 PROCEDURE — 86900 BLOOD TYPING SEROLOGIC ABO: CPT | Performed by: OBSTETRICS & GYNECOLOGY

## 2023-02-14 PROCEDURE — 25010000002 ONDANSETRON PER 1 MG: Performed by: NURSE ANESTHETIST, CERTIFIED REGISTERED

## 2023-02-14 PROCEDURE — 58571 TLH W/T/O 250 G OR LESS: CPT | Performed by: OBSTETRICS & GYNECOLOGY

## 2023-02-14 PROCEDURE — 25010000002 HYDROMORPHONE PER 4 MG: Performed by: NURSE ANESTHETIST, CERTIFIED REGISTERED

## 2023-02-14 PROCEDURE — 86850 RBC ANTIBODY SCREEN: CPT | Performed by: OBSTETRICS & GYNECOLOGY

## 2023-02-14 PROCEDURE — 25010000002 PROPOFOL 200 MG/20ML EMULSION: Performed by: NURSE ANESTHETIST, CERTIFIED REGISTERED

## 2023-02-14 PROCEDURE — 25010000002 HYDROMORPHONE 1 MG/ML SOLUTION: Performed by: OBSTETRICS & GYNECOLOGY

## 2023-02-14 DEVICE — ABSORBABLE RELOAD
Type: IMPLANTABLE DEVICE | Site: ABDOMEN | Status: FUNCTIONAL
Brand: V-LOC 180

## 2023-02-14 RX ORDER — PHENAZOPYRIDINE HYDROCHLORIDE 100 MG/1
200 TABLET, FILM COATED ORAL ONCE
Status: COMPLETED | OUTPATIENT
Start: 2023-02-14 | End: 2023-02-14

## 2023-02-14 RX ORDER — PROCHLORPERAZINE EDISYLATE 5 MG/ML
10 INJECTION INTRAMUSCULAR; INTRAVENOUS ONCE AS NEEDED
Status: DISCONTINUED | OUTPATIENT
Start: 2023-02-14 | End: 2023-02-14 | Stop reason: HOSPADM

## 2023-02-14 RX ORDER — LORAZEPAM 2 MG/ML
1 INJECTION INTRAMUSCULAR
Status: DISCONTINUED | OUTPATIENT
Start: 2023-02-14 | End: 2023-02-14 | Stop reason: HOSPADM

## 2023-02-14 RX ORDER — LIDOCAINE HYDROCHLORIDE 20 MG/ML
INJECTION, SOLUTION INTRAVENOUS AS NEEDED
Status: DISCONTINUED | OUTPATIENT
Start: 2023-02-14 | End: 2023-02-14 | Stop reason: SURG

## 2023-02-14 RX ORDER — ONDANSETRON 2 MG/ML
4 INJECTION INTRAMUSCULAR; INTRAVENOUS EVERY 6 HOURS PRN
Status: DISCONTINUED | OUTPATIENT
Start: 2023-02-14 | End: 2023-02-15 | Stop reason: HOSPADM

## 2023-02-14 RX ORDER — CEFAZOLIN SODIUM 2 G/50ML
2 SOLUTION INTRAVENOUS ONCE
Status: COMPLETED | OUTPATIENT
Start: 2023-02-14 | End: 2023-02-14

## 2023-02-14 RX ORDER — SODIUM CHLORIDE, SODIUM LACTATE, POTASSIUM CHLORIDE, CALCIUM CHLORIDE 600; 310; 30; 20 MG/100ML; MG/100ML; MG/100ML; MG/100ML
1000 INJECTION, SOLUTION INTRAVENOUS CONTINUOUS
Status: DISCONTINUED | OUTPATIENT
Start: 2023-02-14 | End: 2023-02-14

## 2023-02-14 RX ORDER — DOCUSATE SODIUM 100 MG/1
100 CAPSULE, LIQUID FILLED ORAL 2 TIMES DAILY PRN
Status: DISCONTINUED | OUTPATIENT
Start: 2023-02-14 | End: 2023-02-15 | Stop reason: HOSPADM

## 2023-02-14 RX ORDER — MEPERIDINE HYDROCHLORIDE 25 MG/ML
12.5 INJECTION INTRAMUSCULAR; INTRAVENOUS; SUBCUTANEOUS
Status: DISCONTINUED | OUTPATIENT
Start: 2023-02-14 | End: 2023-02-14 | Stop reason: HOSPADM

## 2023-02-14 RX ORDER — ACETAMINOPHEN 500 MG
1000 TABLET ORAL EVERY 8 HOURS
Status: DISCONTINUED | OUTPATIENT
Start: 2023-02-14 | End: 2023-02-15 | Stop reason: HOSPADM

## 2023-02-14 RX ORDER — SODIUM CHLORIDE 0.9 % (FLUSH) 0.9 %
10 SYRINGE (ML) INJECTION EVERY 12 HOURS SCHEDULED
Status: DISCONTINUED | OUTPATIENT
Start: 2023-02-14 | End: 2023-02-14 | Stop reason: HOSPADM

## 2023-02-14 RX ORDER — SODIUM CHLORIDE 0.9 % (FLUSH) 0.9 %
10 SYRINGE (ML) INJECTION AS NEEDED
Status: DISCONTINUED | OUTPATIENT
Start: 2023-02-14 | End: 2023-02-14 | Stop reason: HOSPADM

## 2023-02-14 RX ORDER — OXYCODONE HYDROCHLORIDE 5 MG/1
10 TABLET ORAL EVERY 4 HOURS PRN
Status: DISCONTINUED | OUTPATIENT
Start: 2023-02-14 | End: 2023-02-15 | Stop reason: HOSPADM

## 2023-02-14 RX ORDER — FENTANYL CITRATE 50 UG/ML
INJECTION, SOLUTION INTRAMUSCULAR; INTRAVENOUS AS NEEDED
Status: DISCONTINUED | OUTPATIENT
Start: 2023-02-14 | End: 2023-02-14 | Stop reason: SURG

## 2023-02-14 RX ORDER — NALOXONE HCL 0.4 MG/ML
0.1 VIAL (ML) INJECTION
Status: DISCONTINUED | OUTPATIENT
Start: 2023-02-14 | End: 2023-02-15 | Stop reason: HOSPADM

## 2023-02-14 RX ORDER — HYDROMORPHONE HCL 110MG/55ML
PATIENT CONTROLLED ANALGESIA SYRINGE INTRAVENOUS AS NEEDED
Status: DISCONTINUED | OUTPATIENT
Start: 2023-02-14 | End: 2023-02-14 | Stop reason: SURG

## 2023-02-14 RX ORDER — SODIUM CHLORIDE 9 MG/ML
40 INJECTION, SOLUTION INTRAVENOUS AS NEEDED
Status: DISCONTINUED | OUTPATIENT
Start: 2023-02-14 | End: 2023-02-14 | Stop reason: HOSPADM

## 2023-02-14 RX ORDER — BISACODYL 10 MG
10 SUPPOSITORY, RECTAL RECTAL DAILY PRN
Status: DISCONTINUED | OUTPATIENT
Start: 2023-02-14 | End: 2023-02-15 | Stop reason: HOSPADM

## 2023-02-14 RX ORDER — ONDANSETRON 4 MG/1
4 TABLET, FILM COATED ORAL EVERY 6 HOURS PRN
Status: DISCONTINUED | OUTPATIENT
Start: 2023-02-14 | End: 2023-02-15 | Stop reason: HOSPADM

## 2023-02-14 RX ORDER — PROPOFOL 10 MG/ML
INJECTION, EMULSION INTRAVENOUS AS NEEDED
Status: DISCONTINUED | OUTPATIENT
Start: 2023-02-14 | End: 2023-02-14 | Stop reason: SURG

## 2023-02-14 RX ORDER — ONDANSETRON 2 MG/ML
INJECTION INTRAMUSCULAR; INTRAVENOUS AS NEEDED
Status: DISCONTINUED | OUTPATIENT
Start: 2023-02-14 | End: 2023-02-14 | Stop reason: SURG

## 2023-02-14 RX ORDER — IBUPROFEN 800 MG/1
800 TABLET ORAL EVERY 8 HOURS
Status: DISCONTINUED | OUTPATIENT
Start: 2023-02-15 | End: 2023-02-15 | Stop reason: HOSPADM

## 2023-02-14 RX ORDER — MAGNESIUM HYDROXIDE 1200 MG/15ML
LIQUID ORAL AS NEEDED
Status: DISCONTINUED | OUTPATIENT
Start: 2023-02-14 | End: 2023-02-14 | Stop reason: HOSPADM

## 2023-02-14 RX ORDER — SUCCINYLCHOLINE CHLORIDE 20 MG/ML
INJECTION INTRAMUSCULAR; INTRAVENOUS AS NEEDED
Status: DISCONTINUED | OUTPATIENT
Start: 2023-02-14 | End: 2023-02-14 | Stop reason: SURG

## 2023-02-14 RX ORDER — OXYCODONE HYDROCHLORIDE 5 MG/1
5 TABLET ORAL EVERY 4 HOURS PRN
Status: DISCONTINUED | OUTPATIENT
Start: 2023-02-14 | End: 2023-02-15 | Stop reason: HOSPADM

## 2023-02-14 RX ORDER — DEXTROSE AND SODIUM CHLORIDE 5; .45 G/100ML; G/100ML
125 INJECTION, SOLUTION INTRAVENOUS CONTINUOUS
Status: DISCONTINUED | OUTPATIENT
Start: 2023-02-14 | End: 2023-02-15 | Stop reason: HOSPADM

## 2023-02-14 RX ORDER — DEXAMETHASONE SODIUM PHOSPHATE 4 MG/ML
INJECTION, SOLUTION INTRA-ARTICULAR; INTRALESIONAL; INTRAMUSCULAR; INTRAVENOUS; SOFT TISSUE AS NEEDED
Status: DISCONTINUED | OUTPATIENT
Start: 2023-02-14 | End: 2023-02-14 | Stop reason: SURG

## 2023-02-14 RX ORDER — SIMETHICONE 80 MG
80 TABLET,CHEWABLE ORAL 4 TIMES DAILY PRN
Status: DISCONTINUED | OUTPATIENT
Start: 2023-02-14 | End: 2023-02-15 | Stop reason: HOSPADM

## 2023-02-14 RX ORDER — ROCURONIUM BROMIDE 10 MG/ML
INJECTION, SOLUTION INTRAVENOUS AS NEEDED
Status: DISCONTINUED | OUTPATIENT
Start: 2023-02-14 | End: 2023-02-14 | Stop reason: SURG

## 2023-02-14 RX ORDER — NALOXONE HCL 0.4 MG/ML
0.4 VIAL (ML) INJECTION
Status: DISCONTINUED | OUTPATIENT
Start: 2023-02-14 | End: 2023-02-15 | Stop reason: HOSPADM

## 2023-02-14 RX ADMIN — ONDANSETRON 4 MG: 2 INJECTION INTRAMUSCULAR; INTRAVENOUS at 07:36

## 2023-02-14 RX ADMIN — PHENAZOPYRIDINE HYDROCHLORIDE 200 MG: 100 TABLET, FILM COATED ORAL at 06:31

## 2023-02-14 RX ADMIN — DEXTROSE AND SODIUM CHLORIDE 125 ML/HR: 5; 450 INJECTION, SOLUTION INTRAVENOUS at 17:52

## 2023-02-14 RX ADMIN — DEXTROSE AND SODIUM CHLORIDE 125 ML/HR: 5; 450 INJECTION, SOLUTION INTRAVENOUS at 10:01

## 2023-02-14 RX ADMIN — HYDROMORPHONE HYDROCHLORIDE 0.5 MG: 2 INJECTION, SOLUTION INTRAMUSCULAR; INTRAVENOUS; SUBCUTANEOUS at 09:01

## 2023-02-14 RX ADMIN — HYDROMORPHONE HYDROCHLORIDE 0.5 MG: 2 INJECTION, SOLUTION INTRAMUSCULAR; INTRAVENOUS; SUBCUTANEOUS at 08:42

## 2023-02-14 RX ADMIN — ACETAMINOPHEN 1000 MG: 500 TABLET, FILM COATED ORAL at 19:26

## 2023-02-14 RX ADMIN — HYDROMORPHONE HYDROCHLORIDE 0.5 MG: 1 INJECTION, SOLUTION INTRAMUSCULAR; INTRAVENOUS; SUBCUTANEOUS at 20:40

## 2023-02-14 RX ADMIN — HYDROMORPHONE HYDROCHLORIDE 0.5 MG: 2 INJECTION, SOLUTION INTRAMUSCULAR; INTRAVENOUS; SUBCUTANEOUS at 08:18

## 2023-02-14 RX ADMIN — DEXAMETHASONE SODIUM PHOSPHATE 4 MG: 4 INJECTION, SOLUTION INTRAMUSCULAR; INTRAVENOUS at 07:36

## 2023-02-14 RX ADMIN — PROPOFOL 200 MG: 10 INJECTION, EMULSION INTRAVENOUS at 07:36

## 2023-02-14 RX ADMIN — FENTANYL CITRATE 100 MCG: 50 INJECTION INTRAMUSCULAR; INTRAVENOUS at 07:36

## 2023-02-14 RX ADMIN — LIDOCAINE HYDROCHLORIDE 60 MG: 20 INJECTION, SOLUTION INTRAVENOUS at 07:36

## 2023-02-14 RX ADMIN — ACETAMINOPHEN 1000 MG: 500 TABLET, FILM COATED ORAL at 12:45

## 2023-02-14 RX ADMIN — CEFAZOLIN SODIUM 2 G: 2 SOLUTION INTRAVENOUS at 07:43

## 2023-02-14 RX ADMIN — SODIUM CHLORIDE, POTASSIUM CHLORIDE, SODIUM LACTATE AND CALCIUM CHLORIDE 1000 ML: 600; 310; 30; 20 INJECTION, SOLUTION INTRAVENOUS at 07:06

## 2023-02-14 RX ADMIN — HYDROMORPHONE HYDROCHLORIDE 0.5 MG: 1 INJECTION, SOLUTION INTRAMUSCULAR; INTRAVENOUS; SUBCUTANEOUS at 14:54

## 2023-02-14 RX ADMIN — SIMETHICONE 80 MG: 80 TABLET, CHEWABLE ORAL at 12:55

## 2023-02-14 RX ADMIN — ROCURONIUM BROMIDE 50 MG: 10 INJECTION INTRAVENOUS at 07:36

## 2023-02-14 RX ADMIN — HYDROMORPHONE HYDROCHLORIDE 0.5 MG: 2 INJECTION, SOLUTION INTRAMUSCULAR; INTRAVENOUS; SUBCUTANEOUS at 08:01

## 2023-02-14 RX ADMIN — SUCCINYLCHOLINE CHLORIDE 200 MG: 20 INJECTION, SOLUTION INTRAMUSCULAR; INTRAVENOUS at 07:36

## 2023-02-14 NOTE — ANESTHESIA POSTPROCEDURE EVALUATION
Patient: Juvencio Ramirez    Procedure Summary     Date: 02/14/23 Room / Location: [facility] OR 2 /  [facility]    Anesthesia Start: 0738 Anesthesia Stop: 0915    Procedure: TOTAL LAPAROSCOPIC HYSTERECTOMY AND CYSTOSCOPY (Abdomen) Diagnosis:       Menorrhagia with irregular cycle      (Menorrhagia with irregular cycle [N92.1])    Surgeons: Chandan Beckett MD Provider: Kevin Romero CRNA    Anesthesia Type: general ASA Status: 2          Anesthesia Type: general    Vitals  Vitals Value Taken Time   /74 02/14/23 0913   Temp     Pulse 81 02/14/23 0915   Resp     SpO2     Vitals shown include unvalidated device data.        Post Anesthesia Care and Evaluation    Patient location during evaluation: PACU  Patient participation: complete - patient participated  Level of consciousness: awake and alert and awake  Pain score: 3  Pain management: adequate    Airway patency: patent  Anesthetic complications: No anesthetic complications  PONV Status: controlled  Cardiovascular status: acceptable, hemodynamically stable and stable  Respiratory status: acceptable and nasal cannula  Hydration status: acceptable    Comments: Vital signs as noted in nursing documentation as per protocol.

## 2023-02-14 NOTE — ANESTHESIA PROCEDURE NOTES
Airway  Urgency: elective    Date/Time: 2/14/2023 7:38 AM  Airway not difficult    General Information and Staff    Patient location during procedure: OR  CRNA/CAA: Kevin Romero CRNA    Indications and Patient Condition  Indications for airway management: airway protection    Preoxygenated: yes  Mask difficulty assessment: 1 - vent by mask    Final Airway Details  Final airway type: endotracheal airway      Successful airway: ETT  Cuffed: yes   Successful intubation technique: direct laryngoscopy  Facilitating devices/methods: intubating stylet  Endotracheal tube insertion site: oral  Blade: Ramirez  Blade size: 2  ETT size (mm): 7.5  Cormack-Lehane Classification: grade I - full view of glottis  Placement verified by: chest auscultation and capnometry   Measured from: lips  ETT/EBT  to lips (cm): 22  Number of attempts at approach: 1  Assessment: lips, teeth, and gum same as pre-op and atraumatic intubation    Additional Comments  Airway placed without problems. Dentition and Lips as pre-induction. ETT cuff inflated to minimal occlusive pressure.

## 2023-02-14 NOTE — ANESTHESIA PREPROCEDURE EVALUATION
Anesthesia Evaluation     Patient summary reviewed and Nursing notes reviewed   no history of anesthetic complications:  NPO Solid Status: > 8 hours  NPO Liquid Status: > 8 hours           Airway   Mallampati: II  TM distance: >3 FB  Neck ROM: full  Possible difficult intubation  Dental - normal exam     Pulmonary - normal exam   (+) a smoker Current,   Cardiovascular - negative cardio ROS and normal exam  Exercise tolerance: good (4-7 METS)        Neuro/Psych- negative ROS  GI/Hepatic/Renal/Endo    (+)   hepatitis C, liver disease,     Musculoskeletal     Abdominal    Substance History      OB/GYN          Other - negative ROS       ROS/Med Hx Other: 12.6/39.3  K 3.7                    Anesthesia Plan    ASA 2     general     (Risks and benefits discussed including risk of aspiration, recall and dental damage. All patient questions answered. Will continue with POC.)  intravenous induction     Anesthetic plan, risks, benefits, and alternatives have been provided, discussed and informed consent has been obtained with: patient.  Pre-procedure education provided  Plan discussed with CRNA.        CODE STATUS:

## 2023-02-14 NOTE — OP NOTE
Asher Ramirez  : 1990  MRN: [id number]  CSN: [id number]  Date: 2023    Operative Report    TOTAL LAPAROSCOPIC HYSTERECTOMY/cystoscopy/bilateral salpingectomy/lysis of adhesions    Pre-op Diagnosis:  Menorrhagia with irregular cycle [N92.1]   Post-op Diagnosis:  Post-Op Diagnosis Codes:     * Menorrhagia with irregular cycle [N92.1]   Procedure: Procedure(s):  TOTAL LAPAROSCOPIC HYSTERECTOMY AND CYSTOSCOPY/bilateral salpingectomy/lysis of adhesions   Surgeon: Surgeon(s):  Chandan Beckett MD   Assist: staff  was responsible for performing the following activities: Retraction and their skilled assistance was necessary for the success of this case.     Anesthesia: General   Estimated Blood Loss: 150 mls   Fluids: See anesthesia and nursing notes   UOP: See anesthesia and nursing notes   Drains: Tang   ABx: cefazolin 2 gms   Specimens:   Uterus, tubes, cervix   Findings:  Slightly enlarged boggy uterus.  Normal adnexa.  Filshie clips noted on tubes.  Normal bladder mucosa.  Bilateral jets of Pyridium stained urine noted from the ureteral orifices   Complications: none   Indications:  Persistent abnormal uterine bleeding failing medical management.  Patient desires definitive management.  Risks benefits and alternatives were discussed and all questions were answered.     Description of Procedure:  After the appropriate time out and after adequate dosing of her anesthesia, the patient was prepped and draped in the usual sterile fashion.  The patient was placed in the dorsal lithotomy position using Jermaine stirrups.  A tang catheter had been placed in the bladder for drainage during the procedure.  A weighted speculum was placed in the vagina.  The anterior lip of the cervix was grasped with a single tooth tenaculum.  The uterus was sounded.  The cervix was dilated.  Measurements were taken and the AISLINN manipulator was placed in the usual fashion.  An infraumbilical skin incision  was made with the knife and a 10 mm trocar was inserted using the Optiview without any complications.  The abdomen was insufflated with CO2 being sure to keep the pressure less than 15 mmHg.  Bilateral ancillary ports were placed with 5 mm port in the right lower quadrant and a 10 mm port in the left lower quadrant with the aid of transillumination and after identification of the inferior epigastric vessels.  The ports were placed under direct visualization without any complications.  The pelvis was explored with the above findings noted.  The ureters were identified bilaterally and noted to be out of the operative fields at all times.  Mesosalpinx cauterized transected with the Enseal.  Right tube was excised and retrieved through the right lower quadrant port.  Similar process.  The patient's left.  Utero-ovarian ligament was cauterized transected on the patient's right.  Dissection along the broad ligament to a level distal to the round ligament was carried out with the Enseal.  Uterine vasculature after development of the bladder flap was cauterized and transected with the Enseal.  Adhesions of the bladder flap from 3 prior C-sections were taken down with traction and countertraction as well as the harmonic scalpel.  A similar process was repeated on the patient's contralateral side without any complications.  Once adequate dissection and development of the bladder flap had been created and a white fascial plane was noted, circumferential excision of the specimen overlying the AISLINN cup in the vagina was carried out and the specimen was amputated, retrieved, and held in the vagina to maintain a pneumoperitoneum.  Bleeding points were cauterized.  The vaginal cuff was closed in a running fashion with #1 barbed Endostitch x 2.  Irrigation and aspiration was carried out.  No bleeding was noted.  The abdomen was released of CO2 and no bleeding was noted.  Instrumentation was removed.  Full desufflation was then  carried out.  The incisions were inspected and noted to be hemostatic with minimal bovie electrocautery.  The incisions were closed with 4-0 nylon in an interrupted fashion.  Dressings were placed.  The tang catheter was removed.  The cystoscope was introduced.  The patient was noted to have bilateral ureteral jet flow of pyridium stained urine.  There was no evidence of bladder mucosal injury.  The tang catheter was re-anchored.  A vaginal pack was placed.  All instrument and sponge counts were correct at the end of the procedure.  There were no complications.  The patient tolerated the procedure well.  She was taken to the postoperative recovery room in stable condition.      Chandan Beckett MD   2/14/2023  09:15 EST

## 2023-02-14 NOTE — PLAN OF CARE
Goal Outcome Evaluation:              Outcome Evaluation: VSS, pain managed adequately, I/Os adequate

## 2023-02-15 VITALS
HEART RATE: 70 BPM | OXYGEN SATURATION: 98 % | DIASTOLIC BLOOD PRESSURE: 73 MMHG | RESPIRATION RATE: 17 BRPM | SYSTOLIC BLOOD PRESSURE: 117 MMHG | TEMPERATURE: 98.8 F

## 2023-02-15 LAB
DEPRECATED RDW RBC AUTO: 43.4 FL (ref 37–54)
ERYTHROCYTE [DISTWIDTH] IN BLOOD BY AUTOMATED COUNT: 12.5 % (ref 12.3–15.4)
HCT VFR BLD AUTO: 33.5 % (ref 34–46.6)
HGB BLD-MCNC: 10.8 G/DL (ref 12–15.9)
MCH RBC QN AUTO: 30.7 PG (ref 26.6–33)
MCHC RBC AUTO-ENTMCNC: 32.2 G/DL (ref 31.5–35.7)
MCV RBC AUTO: 95.2 FL (ref 79–97)
PLATELET # BLD AUTO: 269 10*3/MM3 (ref 140–450)
PMV BLD AUTO: 11.1 FL (ref 6–12)
RBC # BLD AUTO: 3.52 10*6/MM3 (ref 3.77–5.28)
WBC NRBC COR # BLD: 10.85 10*3/MM3 (ref 3.4–10.8)

## 2023-02-15 PROCEDURE — 85027 COMPLETE CBC AUTOMATED: CPT | Performed by: OBSTETRICS & GYNECOLOGY

## 2023-02-15 PROCEDURE — G0378 HOSPITAL OBSERVATION PER HR: HCPCS

## 2023-02-15 RX ORDER — DOCUSATE SODIUM 100 MG/1
100 CAPSULE, LIQUID FILLED ORAL 2 TIMES DAILY PRN
Qty: 60 CAPSULE | Refills: 1 | Status: SHIPPED | OUTPATIENT
Start: 2023-02-15

## 2023-02-15 RX ORDER — OXYCODONE HYDROCHLORIDE 5 MG/1
5 TABLET ORAL EVERY 6 HOURS PRN
Qty: 8 TABLET | Refills: 0 | Status: SHIPPED | OUTPATIENT
Start: 2023-02-15

## 2023-02-15 RX ORDER — IBUPROFEN 800 MG/1
800 TABLET ORAL EVERY 8 HOURS
Qty: 30 TABLET | Refills: 0 | Status: SHIPPED | OUTPATIENT
Start: 2023-02-15

## 2023-02-15 RX ORDER — SIMETHICONE 80 MG
80 TABLET,CHEWABLE ORAL 4 TIMES DAILY PRN
Qty: 30 TABLET | Refills: 0 | Status: SHIPPED | OUTPATIENT
Start: 2023-02-15

## 2023-02-15 RX ORDER — ACETAMINOPHEN 500 MG
1000 TABLET ORAL EVERY 8 HOURS
Qty: 60 TABLET | Refills: 0 | Status: SHIPPED | OUTPATIENT
Start: 2023-02-15

## 2023-02-15 RX ADMIN — OXYCODONE HYDROCHLORIDE 5 MG: 5 TABLET ORAL at 08:31

## 2023-02-15 RX ADMIN — IBUPROFEN 800 MG: 800 TABLET, FILM COATED ORAL at 08:31

## 2023-02-15 RX ADMIN — DOCUSATE SODIUM 100 MG: 100 CAPSULE, LIQUID FILLED ORAL at 08:31

## 2023-02-15 RX ADMIN — ACETAMINOPHEN 1000 MG: 500 TABLET, FILM COATED ORAL at 03:25

## 2023-02-15 NOTE — PLAN OF CARE
Goal Outcome Evaluation:              Outcome Evaluation: VSS.  Pain controlled  with medication.  +flatus.  Plans for discharge home today

## 2023-02-15 NOTE — PLAN OF CARE
Goal Outcome Evaluation:           Progress: improving  Outcome Evaluation: VSS, Pain managed with scheduled and PRN medications, Appropriate I and O. Will continue with routine post-op plan of care.

## 2023-02-15 NOTE — PROGRESS NOTES
Patient: Juvencio Ramirez  Procedure(s):  TOTAL LAPAROSCOPIC HYSTERECTOMY AND CYSTOSCOPY  Anesthesia type: General    Patient location: Labor and Delivery  Last vitals: /64   Pulse 103   Temp 99 °F (37.2 °C) (Oral)   Resp 18   LMP 01/29/2023   SpO2 97%   Level of consciousness: awake, alert and oriented    Post-anesthesia pain: adequate analgesia  Airway patency: patent  Respiratory: unassisted  Cardiovascular: stable and blood pressure at baseline  Hydration: euvolemic    Anesthetic complications: no

## 2023-02-15 NOTE — DISCHARGE SUMMARY
Discharge Summary      Date of Admission: 2/14/2023   Date of Discharge:    Admitting Diagnosis: Menorrhagia with irregular cycle [N92.1]   Discharge Diagnosis: Total laparoscopic hysterectomy, bilateral salpingectomy, lysis of adhesions, cystoscopy.   Procedures Performed: Procedure(s):  TOTAL LAPAROSCOPIC HYSTERECTOMY AND CYSTOSCOPY      Consults: Consults     No orders found for last 30 day(s).         Brief History: Patient is a 32 y.o. female who underwent the aforementioned surgery for menorrhagia with irregular cycle.  Patient had been anemic due to her heavy menses.  She had failed medical management for her symptoms.   Hospital Course:  Patient had an unremarkable postoperative course.  On day 1 postop the patient was feeling well.  She was ambulating without difficulty.  She had passed gas.  She was voiding without difficulty.  Her pain was well controlled.  She had tolerated diet.  She felt ready for discharge.        Pending Studies: Pending Labs     Order Current Status    TISSUE EXAM, [facility]) In process           Condition at discharge: good   Discharge Medications:    Discharge Medications      New Medications      Instructions Start Date   acetaminophen 500 MG tablet  Commonly known as: TYLENOL   1,000 mg, Oral, Every 8 Hours      docusate sodium 100 MG capsule   100 mg, Oral, 2 Times Daily PRN      simethicone 80 MG chewable tablet  Commonly known as: MYLICON   80 mg, Oral, 4 Times Daily PRN         Changes to Medications      Instructions Start Date   ibuprofen 800 MG tablet  Commonly known as: ADVIL,MOTRIN  What changed:   · medication strength  · how much to take  · when to take this  · reasons to take this   800 mg, Oral, Every 8 Hours         Stop These Medications    Prenatal 27-1 27-1 MG tablet tablet           Discharge Disposition: Home or Self Care   Follow-up: Future Appointments   Date Time Provider Department Center   2/21/2023  1:10 PM Velma Allison PA-C [facility]          Time: Discharge 15 min    Follow up:   Future Appointments   Date Time Provider Department Center   2/21/2023  1:10 PM Velma Allison PA-C [facility]       This note has been electronically signed.    Vlema Allison PA-C   February 15, 2023

## 2023-02-16 LAB — REF LAB TEST METHOD: NORMAL

## 2023-02-21 ENCOUNTER — OFFICE VISIT (OUTPATIENT)
Dept: OBSTETRICS AND GYNECOLOGY | Facility: CLINIC | Age: 33
End: 2023-02-21
Payer: COMMERCIAL

## 2023-02-21 VITALS
BODY MASS INDEX: 22.14 KG/M2 | HEIGHT: 66 IN | SYSTOLIC BLOOD PRESSURE: 110 MMHG | WEIGHT: 137.8 LBS | DIASTOLIC BLOOD PRESSURE: 78 MMHG

## 2023-02-21 DIAGNOSIS — Z09 POSTOPERATIVE FOLLOW-UP: Primary | ICD-10-CM

## 2023-02-21 PROCEDURE — 99024 POSTOP FOLLOW-UP VISIT: CPT | Performed by: PHYSICIAN ASSISTANT

## 2023-02-21 NOTE — PROGRESS NOTES
Subjective   Chief Complaint   Patient presents with   • Post-op     One week post-op TLH, sutures removed and steri-strips applied, doing well       Juvencio Ramirez is a 33 y.o. year old  presenting to be seen for post op visit  Doing well post procedure  Normal bowel and bladder function. No vaginal bleeding  Pathology benign    Past Medical History:   Diagnosis Date   • COVID-19 vaccine series completed    • Hepatitis C     s/p treatment and is negative now.   • Herpes    • Infectious viral hepatitis    • Ovarian cyst    • Wears glasses         Current Outpatient Medications:   •  acetaminophen (TYLENOL) 500 MG tablet, Take 2 tablets by mouth Every 8 (Eight) Hours., Disp: 60 tablet, Rfl: 0  •  docusate sodium (COLACE) 100 MG capsule, Take 1 capsule by mouth 2 (Two) Times a Day As Needed for Constipation., Disp: 60 capsule, Rfl: 1  •  ibuprofen (ADVIL,MOTRIN) 800 MG tablet, Take 1 tablet by mouth Every 8 (Eight) Hours., Disp: 30 tablet, Rfl: 0  •  oxyCODONE (Roxicodone) 5 MG immediate release tablet, Take 1 tablet by mouth Every 6 (Six) Hours As Needed for Moderate Pain., Disp: 8 tablet, Rfl: 0  •  simethicone (MYLICON) 80 MG chewable tablet, Chew 1 tablet 4 (Four) Times a Day As Needed for Flatulence., Disp: 30 tablet, Rfl: 0   No Known Allergies   Past Surgical History:   Procedure Laterality Date   •  SECTION  2012    x3   •  SECTION     •  SECTION PRIMARY     • DILATION AND CURETTAGE, DIAGNOSTIC / THERAPEUTIC     • TOTAL LAPAROSCOPIC HYSTERECTOMY N/A 2023    Procedure: TOTAL LAPAROSCOPIC HYSTERECTOMY AND CYSTOSCOPY;  Surgeon: Chandan Beckett MD;  Location: [facility];  Service: Obstetrics/Gynecology;  Laterality: N/A;   • TUBAL COAGULATION LAPAROSCOPIC Bilateral 2022    Procedure: TUBAL FALLOPE FILSHIE CLIPPING LAPAROSCOPIC;  Surgeon: Chandan Beckett MD;  Location: [facility];  Service: Obstetrics/Gynecology;  Laterality: Bilateral;   •  "WISDOM TOOTH EXTRACTION        Social History     Socioeconomic History   • Marital status: Single   Tobacco Use   • Smoking status: Former     Packs/day: 0.25     Years: 10.00     Pack years: 2.50     Types: Cigarettes     Quit date: 2022     Years since quittin.3   • Smokeless tobacco: Never   Vaping Use   • Vaping Use: Every day   • Substances: Nicotine, Flavoring   • Devices: Disposable   Substance and Sexual Activity   • Alcohol use: Yes     Comment: on occassion   • Drug use: Not Currently     Types: Heroin     Comment: hx IV drug use, none in 5 years   • Sexual activity: Defer     Birth control/protection: Hysterectomy      Family History   Problem Relation Age of Onset   • Diabetes Mother    • Thyroid disease Mother        Review of Systems   Constitutional: Negative for chills, diaphoresis and fever.   Gastrointestinal: Negative for constipation, diarrhea, nausea and vomiting.   Genitourinary: Negative for difficulty urinating, dysuria, pelvic pain, vaginal bleeding and vaginal discharge.           Objective   /78   Ht 167.6 cm (66\")   Wt 62.5 kg (137 lb 12.8 oz)   LMP 2023   BMI 22.24 kg/m²     Physical Exam  Constitutional:       Appearance: Normal appearance. She is well-developed and well-groomed.   Eyes:      General: Lids are normal.      Extraocular Movements: Extraocular movements intact.      Conjunctiva/sclera: Conjunctivae normal.   Abdominal:      General: There is no distension.      Palpations: Abdomen is soft.      Tenderness: There is no abdominal tenderness.      Comments: Incisions healing well   Neurological:      Mental Status: She is alert.   Psychiatric:         Attention and Perception: Attention normal.         Mood and Affect: Mood normal. Mood is not anxious.         Speech: Speech normal.         Behavior: Behavior is cooperative.            Result Review :                   Assessment and Plan  Diagnoses and all orders for this visit:    1. Postoperative " follow-up (Primary)      Patient Instructions   Continue light activity  Follow up 5 weeks or prn             This note was electronically signed.    Velma Allison PA-C   February 21, 2023

## 2023-03-28 ENCOUNTER — OFFICE VISIT (OUTPATIENT)
Dept: OBSTETRICS AND GYNECOLOGY | Facility: CLINIC | Age: 33
End: 2023-03-28
Payer: COMMERCIAL

## 2023-03-28 VITALS — BODY MASS INDEX: 22.66 KG/M2 | WEIGHT: 140.4 LBS | SYSTOLIC BLOOD PRESSURE: 110 MMHG | DIASTOLIC BLOOD PRESSURE: 72 MMHG

## 2023-03-28 DIAGNOSIS — Z09 POSTOP CHECK: Primary | ICD-10-CM

## 2023-03-28 NOTE — PROGRESS NOTES
Subjective   Chief Complaint   Patient presents with   • Post-op     6 Weeks post op Total Laparoscopic Hysterectomy,  Cystoscopy     Juvencio Ramirez is a 33 y.o. year old .  Patient's last menstrual period was 2023.  She presents to be seen because of post op TLH-- benign path- adeno  Tired to have sex last week-- no issues.     OTHER COMPLAINTS:  Nothing else    The following portions of the patient's history were reviewed and updated as appropriate:  She  has a past medical history of COVID-19 vaccine series completed, Hepatitis C, Herpes, Infectious viral hepatitis, Ovarian cyst, and Wears glasses.  She does not have any pertinent problems on file.  She  has a past surgical history that includes  section ();  section, low transverse (); Tubal Sterilization (Bilateral, 2022);  section (); [place] tooth extraction; Dilation and curettage, diagnostic / therapeutic; and total laparoscopic hysterectomy (N/A, 2023).  Her family history includes Diabetes in her mother; Thyroid disease in her mother.  She  reports that she quit smoking about 4 months ago. Her smoking use included cigarettes. She has a 2.50 pack-year smoking history. She has never used smokeless tobacco. She reports current alcohol use. She reports that she does not currently use drugs after having used the following drugs: Heroin.  Current Outpatient Medications   Medication Sig Dispense Refill   • acetaminophen (TYLENOL) 500 MG tablet Take 2 tablets by mouth Every 8 (Eight) Hours. (Patient not taking: Reported on 3/28/2023) 60 tablet 0   • docusate sodium (COLACE) 100 MG capsule Take 1 capsule by mouth 2 (Two) Times a Day As Needed for Constipation. (Patient not taking: Reported on 3/28/2023) 60 capsule 1   • ibuprofen (ADVIL,MOTRIN) 800 MG tablet Take 1 tablet by mouth Every 8 (Eight) Hours. (Patient not taking: Reported on 3/28/2023) 30 tablet 0   • oxyCODONE (Roxicodone) 5 MG immediate  release tablet Take 1 tablet by mouth Every 6 (Six) Hours As Needed for Moderate Pain. (Patient not taking: Reported on 3/28/2023) 8 tablet 0   • simethicone (MYLICON) 80 MG chewable tablet Chew 1 tablet 4 (Four) Times a Day As Needed for Flatulence. (Patient not taking: Reported on 3/28/2023) 30 tablet 0     No current facility-administered medications for this visit.     Current Outpatient Medications on File Prior to Visit   Medication Sig   • acetaminophen (TYLENOL) 500 MG tablet Take 2 tablets by mouth Every 8 (Eight) Hours. (Patient not taking: Reported on 3/28/2023)   • docusate sodium (COLACE) 100 MG capsule Take 1 capsule by mouth 2 (Two) Times a Day As Needed for Constipation. (Patient not taking: Reported on 3/28/2023)   • ibuprofen (ADVIL,MOTRIN) 800 MG tablet Take 1 tablet by mouth Every 8 (Eight) Hours. (Patient not taking: Reported on 3/28/2023)   • oxyCODONE (Roxicodone) 5 MG immediate release tablet Take 1 tablet by mouth Every 6 (Six) Hours As Needed for Moderate Pain. (Patient not taking: Reported on 3/28/2023)   • simethicone (MYLICON) 80 MG chewable tablet Chew 1 tablet 4 (Four) Times a Day As Needed for Flatulence. (Patient not taking: Reported on 3/28/2023)     No current facility-administered medications on file prior to visit.     She has No Known Allergies.    Social History    Tobacco Use      Smoking status: Former        Packs/day: 0.25        Years: 10.00        Pack years: 2.5        Types: Cigarettes        Quit date: 2022        Years since quittin.4      Smokeless tobacco: Never    Review of Systems  Consitutional POS: nothing reported    NEG: anorexia or night sweats   Gastointestinal POS: nothing reported    NEG: bloating, change in bowel habits, melena or reflux symptoms   Genitourinary POS: nothing reported    NEG: dysuria or hematuria   Integument POS: nothing reported    NEG: moles that are changing in size, shape, color or rashes   Breast POS: nothing reported     NEG: persistent breast lump, skin dimpling or nipple discharge         Respiratory: negative  Cardiovascular: negative          Objective   /72   Wt 63.7 kg (140 lb 6.4 oz)   LMP 01/29/2023   BMI 22.66 kg/m²     General:  well developed; well nourished  no acute distress   Skin:  Not performed.   Thyroid: not examined   Lungs:  breathing is unlabored   Heart:  Not performed.   Breasts:  Not performed.   Abdomen: soft, non-tender; no masses  no umbilical or inguinal hernias are present  no hepato-splenomegaly   Pelvis: Clinical staff was present for exam  External genitalia:  normal appearance of the external genitalia including Bartholin's and Southgate's glands.  :  urethral meatus normal;  Vaginal:  normal pink mucosa without prolapse or lesions.  Cervix:  absent.  Uterus:  normal size, shape and consistency.  Adnexa:  normal bimanual exam of the adnexa.  Rectal:  digital rectal exam not performed; anus visually normal appearing.     Psychiatric: Alert and oriented ×3, mood and affect appropriate  HEENT: Atraumatic, normocephalic, normal scleral icterus  Extremities: 2+ pulses bilaterally, no edema      Lab Review   TISSUE EXAM, [facility]) (02/14/2023 07:44)      Imaging   No data reviewed        Assessment   1. Doing well 6 weeks [facility]     Plan   1. MAy return to work  2.     No orders of the defined types were placed in this encounter.         This note was electronically signed.      March 28, 2023

## 2023-03-29 ENCOUNTER — TELEPHONE (OUTPATIENT)
Dept: OBSTETRICS AND GYNECOLOGY | Facility: CLINIC | Age: 33
End: 2023-03-29

## 2023-03-29 NOTE — TELEPHONE ENCOUNTER
Caller: Juvencio Ramirez    Relationship: Self    Best call back number: [phone]    What form or medical record are you requesting: RETURN TO WORK STATEMENT    Who is requesting this form or medical record from you: EMPLOYER     How would you like to receive the form or medical records (pick-up, mail, fax):    If fax, what is the fax number:   If mail, what is the address:   If pick-up, provide patient with address and location details    Timeframe paperwork needed: ASAP    Additional notes: PT WAS RELEASED TO GO BACK TO WORK TODAY 3/29/23 - HER EMPLOYER TOLD HER SINCE SHE WAS ALREADY PAID THRU SHORT TERM DISABILITY THIS WEEK SHE NEEDED A STATEMENT TO RETURN BACK TO WORK ON 4/3/23 PT WILL  STATEMENT WHEN READY CALL WITH ANY QUESTIONS

## 2023-10-14 ENCOUNTER — HOSPITAL ENCOUNTER (EMERGENCY)
Facility: HOSPITAL | Age: 33
Discharge: HOME | End: 2023-10-14
Attending: EMERGENCY MEDICINE
Payer: COMMERCIAL

## 2023-10-14 VITALS
BODY MASS INDEX: 21.69 KG/M2 | TEMPERATURE: 98 F | HEART RATE: 70 BPM | WEIGHT: 135 LBS | HEIGHT: 66 IN | SYSTOLIC BLOOD PRESSURE: 136 MMHG | RESPIRATION RATE: 22 BRPM | OXYGEN SATURATION: 100 % | DIASTOLIC BLOOD PRESSURE: 105 MMHG

## 2023-10-14 DIAGNOSIS — K64.4 EXTERNAL HEMORRHOID: Primary | ICD-10-CM

## 2023-10-14 PROCEDURE — 99283 EMERGENCY DEPT VISIT LOW MDM: CPT

## 2023-10-14 RX ORDER — LIDOCAINE 50 MG/G
1 OINTMENT TOPICAL AS NEEDED
Status: DISCONTINUED | OUTPATIENT
Start: 2023-10-14 | End: 2023-10-14 | Stop reason: HOSPADM

## 2023-10-14 RX ORDER — HYDROCORTISONE ACETATE 25 MG/1
25 SUPPOSITORY RECTAL 2 TIMES DAILY
Qty: 12 EACH | Refills: 0 | Status: SHIPPED | OUTPATIENT
Start: 2023-10-14

## 2023-10-14 RX ADMIN — LIDOCAINE 1 APPLICATION: 50 OINTMENT TOPICAL at 11:30

## 2023-10-14 NOTE — Clinical Note
[facility] EMERGENCY DEPARTMENT  [address] [address]  Phone: [phone]    Juvencio Ramirez was seen and treated in our emergency department on 10/14/2023.  She may return to work on 10/16/2023.         Thank you for choosing [facility].    Cherelle Fisher PA-C

## 2023-10-14 NOTE — DISCHARGE INSTRUCTIONS
Use the suppositories to help with the swelling.  You can use the topical lidocaine to help with pain as well.  You can use sitz baths as directed to help.  Try to eat a diet high in fiber, you can take Dulcolax and/or MiraLAX to help soften stools.  Given the size of the hemorrhoid, may need further excision and can follow-up with general surgery.  You can contact Dr. Marshall's office for outpatient evaluation.  Return to the ER for any change, worsening symptoms, or any additional concerns.

## 2023-10-14 NOTE — ED PROVIDER NOTES
Subjective  History of Present Illness:    Chief Complaint: Hemorrhoids   History of Present Illness: Patient is a 33-year-old female with history of hepatitis C, herpes, ovarian cysts and infectious viral hepatitis presenting to the ER for evaluation of hemorrhoids.  Patient reports that she has been dealing with hemorrhoids since her pregnancy 18 months ago.  She states that they usually come and go.  She reports a very large hemorrhoid appeared yesterday and has been very painful.  There is been minimal bleeding.  She states that she used some topical medication that she had that she use during pregnancy but is unsure of the name.  She does report having history of constipation and difficult bowel movements, does not currently use any stool softeners.  She denies any fever, chills, nausea, vomiting, or any other symptoms.  Onset: 2 days  Duration: Persistent  Exacerbating / Alleviating factors: None  Associated symptoms: None      Nurses Notes reviewed and agree, including vitals, allergies, social history and prior medical history.     REVIEW OF SYSTEMS: All systems reviewed and not pertinent unless noted.  Review of Systems      Positive for: Rectal pain    Negative for: Fever, chills, nausea, vomiting, melena or hematochezia    Past Medical History:   Diagnosis Date    COVID-19 vaccine series completed     Hepatitis C     s/p treatment and is negative now.    Herpes     Infectious viral hepatitis     Ovarian cyst     Wears glasses        Allergies:    Patient has no known allergies.      Past Surgical History:   Procedure Laterality Date     SECTION  2012    x3     SECTION       SECTION PRIMARY      DILATION AND CURETTAGE, DIAGNOSTIC / THERAPEUTIC      TOTAL LAPAROSCOPIC HYSTERECTOMY N/A 2023    Procedure: TOTAL LAPAROSCOPIC HYSTERECTOMY AND CYSTOSCOPY;  Surgeon: Chandan Beckett MD;  Location: [facility];  Service: Obstetrics/Gynecology;  Laterality: N/A;    TUBAL  "COAGULATION LAPAROSCOPIC Bilateral 2022    Procedure: TUBAL FALLOPE FILSHIE CLIPPING LAPAROSCOPIC;  Surgeon: Chandan Beckett MD;  Location: [facility];  Service: Obstetrics/Gynecology;  Laterality: Bilateral;    WISDOM TOOTH EXTRACTION           Social History     Socioeconomic History    Marital status: Single   Tobacco Use    Smoking status: Former     Packs/day: 0.25     Years: 10.00     Additional pack years: 0.00     Total pack years: 2.50     Types: Cigarettes     Quit date: 2022     Years since quittin.9    Smokeless tobacco: Never   Vaping Use    Vaping Use: Every day    Substances: Nicotine, Flavoring    Devices: Disposable   Substance and Sexual Activity    Alcohol use: Yes     Comment: on occassion    Drug use: Not Currently     Types: Heroin     Comment: hx IV drug use, none in 5 years    Sexual activity: Defer     Birth control/protection: Hysterectomy         Family History   Problem Relation Age of Onset    Diabetes Mother     Thyroid disease Mother        Objective  Physical Exam:  BP (!) 136/105 (BP Location: Left arm, Patient Position: Sitting)   Pulse 70   Temp 98 øF (36.7 øC) (Oral)   Resp 22   Ht 167.6 cm (66\")   Wt 61.2 kg (135 lb)   LMP 2023   SpO2 100%   BMI 21.79 kg/mý      Physical Exam    CONSTITUTIONAL: Well developed, no acute distress, nontoxic in appearance  VITAL SIGNS: per nursing, reviewed and noted  SKIN: exposed skin with no rashes, ulcerations or petechiae  EYES: Grossly EOMI, no icterus, PERRL  ENT: Normal voice.  Nares patent. No facial asymmetry   RESPIRATORY:  No increased work of breathing. No retractions.   CARDIOVASCULAR:  regular rate   GI: Abdomen soft, nontender. Rectal exam reveals a large external hemorrhoid, does not appear to be thrombosed. No bleeding  MUSCULOSKELETAL:  No tenderness. Full ROM. Strength and tone grossly normal.  no spasms.   NEUROLOGIC: Alert, oriented x 3. No gross deficits. GCS 15.   PSYCH: appropriate " affect.      Procedures    ED Course:        ED Course as of 10/14/23 1218   Sat Oct 14, 2023   1134 Exam performed with REGINA Peterson at bedside. [LR]      ED Course User Index  [LR] Cherelle Fisher PA-C       Lab Results (last 24 hours)       ** No results found for the last 24 hours. **             No radiology results from the last 24 hrs       MDM      Initial impression of presenting illness: Patient is a 33-year-old female presenting to the ER for evaluation of hemorrhoid    DDX: includes but is not limited to: Internal hemorrhoid, external hemorrhoid, anal fissure, constipation, and other concerns.    Patient arrives in overall stable condition with vitals interpreted by myself.     Pertinent features from physical exam: Stable vital signs,.    Initial diagnostic plan: Will give topical lidocaine, Anusol prescription.  Patient did not want bedside excision, prefer to follow-up which I believe is appropriate.    Diagnostic information from other sources: Chart review    Interventions / Re-evaluation: Patient was given lidocaine cream here for pain relief.  We will call in suppositories and give general surgery follow-up    Results/clinical rationale were discussed with patient.  Discussed symptomatic treatment, follow-up with general surgery and primary care provider as needed.  Discussed very strict return precautions to the ER.  She verbalized understanding and was in agreement with this plan of care.    Consultations/Discussion of results with other physicians: None    Disposition plan: Discharge  -----    Final diagnoses:   External hemorrhoid          Cherelle Fisher PA-C  10/14/23 1219

## 2023-10-16 ENCOUNTER — OFFICE VISIT (OUTPATIENT)
Dept: SURGERY | Facility: CLINIC | Age: 33
End: 2023-10-16
Payer: COMMERCIAL

## 2023-10-16 VITALS
HEIGHT: 66 IN | BODY MASS INDEX: 21.53 KG/M2 | WEIGHT: 134 LBS | DIASTOLIC BLOOD PRESSURE: 88 MMHG | OXYGEN SATURATION: 100 % | TEMPERATURE: 98.2 F | HEART RATE: 98 BPM | SYSTOLIC BLOOD PRESSURE: 128 MMHG

## 2023-10-16 DIAGNOSIS — K64.4 EXTERNAL HEMORRHOID: Primary | ICD-10-CM

## 2023-10-16 PROCEDURE — 1160F RVW MEDS BY RX/DR IN RCRD: CPT | Performed by: STUDENT IN AN ORGANIZED HEALTH CARE EDUCATION/TRAINING PROGRAM

## 2023-10-16 PROCEDURE — 1159F MED LIST DOCD IN RCRD: CPT | Performed by: STUDENT IN AN ORGANIZED HEALTH CARE EDUCATION/TRAINING PROGRAM

## 2023-10-16 PROCEDURE — 99203 OFFICE O/P NEW LOW 30 MIN: CPT | Performed by: STUDENT IN AN ORGANIZED HEALTH CARE EDUCATION/TRAINING PROGRAM

## 2023-10-16 RX ORDER — VALACYCLOVIR HYDROCHLORIDE 1 G/1
1 TABLET, FILM COATED ORAL DAILY
COMMUNITY
Start: 2023-07-10 | End: 2023-10-16

## 2023-10-16 NOTE — PROGRESS NOTES
"Patient: Juvencio Ramirez    YOB: 1990    Date: 10/16/2023    Primary Care Provider: Teresa Madrid APRN    Chief Complaint   Patient presents with    Hemorrhoids       SUBJECTIVE:    History of present illness:  Patient is here for evaluation of rectal pain and swelling which she attributes to hemorrhoids.  Patient was seen in the emergency department 10/14/2023 at which time she complained of rectal pain and bleeding at the site.  Patient stated that she has \"been dealing with hemorrhoids since her recent pregnancy 18 months ago.\" This recent episode started last Friday. She was seen in the ED on Saturday and declined excision at that time. She states that it is feeling a bit better today. She has been using hydrocortisone ointment.    The following portions of the patient's history were reviewed and updated as appropriate: allergies, current medications, past family history, past medical history, past social history, past surgical history and problem list.      Review of Systems   Constitutional:  Negative for chills, fever and unexpected weight change.   HENT:  Negative for hearing loss, trouble swallowing and voice change.    Eyes:  Negative for visual disturbance.   Respiratory:  Negative for apnea, cough, chest tightness, shortness of breath and wheezing.    Cardiovascular:  Negative for chest pain, palpitations and leg swelling.   Gastrointestinal:  Positive for constipation, nausea and rectal pain. Negative for abdominal distention, abdominal pain, anal bleeding, blood in stool, diarrhea and vomiting.   Endocrine: Negative for cold intolerance and heat intolerance.   Genitourinary:  Negative for difficulty urinating, dysuria and flank pain.   Musculoskeletal:  Negative for back pain and gait problem.   Skin:  Negative for color change, rash and wound.   Neurological:  Negative for dizziness, syncope, speech difficulty, weakness, light-headedness, numbness and headaches.   Hematological:  " Negative for adenopathy. Does not bruise/bleed easily.   Psychiatric/Behavioral:  Negative for confusion. The patient is not nervous/anxious.        Allergies:  No Known Allergies    Medications:    Current Outpatient Medications:     hydrocortisone (ANUSOL-HC) 25 MG suppository, Insert 1 suppository into the rectum 2 (Two) Times a Day., Disp: 12 each, Rfl: 0    Ventolin  (90 Base) MCG/ACT inhaler, INHALE 2 PUFFS BY MOUTH FOUR TIMES A DAY AS NEEDED, Disp: , Rfl:     History:  Past Medical History:   Diagnosis Date    COVID-19 vaccine series completed     Hepatitis C     s/p treatment and is negative now.    Herpes     Infectious viral hepatitis     Ovarian cyst     Wears glasses        Past Surgical History:   Procedure Laterality Date     SECTION  2012    x3     SECTION       SECTION PRIMARY  2008    DILATION AND CURETTAGE, DIAGNOSTIC / THERAPEUTIC      TOTAL LAPAROSCOPIC HYSTERECTOMY N/A 2023    Procedure: TOTAL LAPAROSCOPIC HYSTERECTOMY AND CYSTOSCOPY;  Surgeon: Chandan Beckett MD;  Location: [facility];  Service: Obstetrics/Gynecology;  Laterality: N/A;    TUBAL COAGULATION LAPAROSCOPIC Bilateral 2022    Procedure: TUBAL FALLOPE FILSHIE CLIPPING LAPAROSCOPIC;  Surgeon: Chandan Beckett MD;  Location: [facility];  Service: Obstetrics/Gynecology;  Laterality: Bilateral;    WISDOM TOOTH EXTRACTION         Family History   Problem Relation Age of Onset    Diabetes Mother     Thyroid disease Mother        Social History     Tobacco Use    Smoking status: Former     Packs/day: 0.25     Years: 10.00     Additional pack years: 0.00     Total pack years: 2.50     Types: Cigarettes     Quit date: 2022     Years since quittin.9    Smokeless tobacco: Never   Vaping Use    Vaping Use: Every day    Substances: Nicotine, Flavoring    Devices: Disposable   Substance Use Topics    Alcohol use: Yes     Comment: on occassion    Drug use: Not Currently      "Types: Heroin     Comment: hx IV drug use, none in 5 years        OBJECTIVE:    Vital Signs:   Vitals:    10/16/23 1428   BP: 128/88   Pulse: 98   Temp: 98.2 °F (36.8 °C)   SpO2: 100%   Weight: 60.8 kg (134 lb)   Height: 167.6 cm (66\")       Physical Exam:   General Appearance:    Alert, cooperative, in no acute distress   Head:    Normocephalic, without obvious abnormality, atraumatic   Eyes:            Lids and lashes normal, conjunctivae and sclerae normal, no   icterus, no pallor, corneas clear, PERRLA   Ears:    Ears appear intact with no abnormalities noted   Throat:   No oral lesions, no thrush, oral mucosa moist   Neck:   No adenopathy, supple, trachea midline, no thyromegaly, no   carotid bruit, no JVD   Lungs:     Clear to auscultation,respirations regular, even and                  unlabored    Heart:    Regular rhythm and normal rate, normal S1 and S2, no            murmur, no gallop, no rub, no click   Chest Wall:    No abnormalities observed   Abdomen:     Normal bowel sounds, no masses, no organomegaly, soft        non-tender, non-distended, no guarding, no rebound                tenderness  Rectal: Large right sided external hemorrhoid   Extremities:   Moves all extremities well, no edema, no cyanosis, no             redness   Pulses:   Pulses palpable and equal bilaterally   Skin:   No bleeding, bruising or rash   Lymph nodes:   No palpable adenopathy   Neurologic:   Cranial nerves 2 - 12 grossly intact, sensation intact, DTR       present and equal bilaterally     Results Review:   I reviewed the patient's new clinical results.  I reviewed the patient's new imaging results and agree with the interpretation.    Review of Systems was reviewed and confirmed as accurate as documented by the MA.    ASSESSMENT/PLAN:    1. External hemorrhoid      I had an extensive discussion with the patient in office today regarding her hemorrhoid. She has a large dominant one that likely thrombosed on Friday. It has " not been more than 72 hours and will likely resolve on its own without excision. I discussed conservative management including increasing fiber and water, avoiding spending extended periods of time on the toilet, avoiding constipation, using hydrocortisone ointment and suppositories, and using Sitz baths up to 3 times daily. The patient will try these for the next month. If it does not resolve, she will call back and we will discuss surgical intervention. The patient is agreeable to the plan and expresses understanding. All of her questions were answered today and education was provided on her MyChart.    I discussed the patients findings and my recommendations with patient        Electronically signed by Aaliyah Kemp DO  10/16/23

## 2024-04-27 ENCOUNTER — APPOINTMENT (OUTPATIENT)
Dept: GENERAL RADIOLOGY | Facility: HOSPITAL | Age: 34
End: 2024-04-27
Payer: COMMERCIAL

## 2024-04-27 ENCOUNTER — HOSPITAL ENCOUNTER (EMERGENCY)
Facility: HOSPITAL | Age: 34
Discharge: HOME | End: 2024-04-27
Attending: STUDENT IN AN ORGANIZED HEALTH CARE EDUCATION/TRAINING PROGRAM
Payer: COMMERCIAL

## 2024-04-27 VITALS
WEIGHT: 130 LBS | DIASTOLIC BLOOD PRESSURE: 98 MMHG | BODY MASS INDEX: 20.89 KG/M2 | OXYGEN SATURATION: 98 % | TEMPERATURE: 98.2 F | RESPIRATION RATE: 14 BRPM | HEIGHT: 66 IN | HEART RATE: 90 BPM | SYSTOLIC BLOOD PRESSURE: 136 MMHG

## 2024-04-27 DIAGNOSIS — J10.1 INFLUENZA A: Primary | ICD-10-CM

## 2024-04-27 LAB
ALBUMIN SERPL-MCNC: 4.8 G/DL (ref 3.5–5.2)
ALBUMIN/GLOB SERPL: 1.7 G/DL
ALP SERPL-CCNC: 73 U/L (ref 39–117)
ALT SERPL W P-5'-P-CCNC: 11 U/L (ref 1–33)
ANION GAP SERPL CALCULATED.3IONS-SCNC: 13.8 MMOL/L (ref 5–15)
AST SERPL-CCNC: 16 U/L (ref 1–32)
BASOPHILS # BLD AUTO: 0.03 10*3/MM3 (ref 0–0.2)
BASOPHILS NFR BLD AUTO: 0.7 % (ref 0–1.5)
BILIRUB SERPL-MCNC: 0.3 MG/DL (ref 0–1.2)
BUN SERPL-MCNC: 10 MG/DL (ref 6–20)
BUN/CREAT SERPL: 16.1 (ref 7–25)
CALCIUM SPEC-SCNC: 9.6 MG/DL (ref 8.6–10.5)
CHLORIDE SERPL-SCNC: 102 MMOL/L (ref 98–107)
CO2 SERPL-SCNC: 26.2 MMOL/L (ref 22–29)
CREAT SERPL-MCNC: 0.62 MG/DL (ref 0.57–1)
D DIMER PPP FEU-MCNC: <0.27 MCGFEU/ML (ref 0–0.5)
DEPRECATED RDW RBC AUTO: 43.7 FL (ref 37–54)
EGFRCR SERPLBLD CKD-EPI 2021: 119.3 ML/MIN/1.73
EOSINOPHIL # BLD AUTO: 0.06 10*3/MM3 (ref 0–0.4)
EOSINOPHIL NFR BLD AUTO: 1.5 % (ref 0.3–6.2)
ERYTHROCYTE [DISTWIDTH] IN BLOOD BY AUTOMATED COUNT: 12.5 % (ref 12.3–15.4)
FLUAV RNA RESP QL NAA+PROBE: DETECTED
FLUBV RNA RESP QL NAA+PROBE: NOT DETECTED
GLOBULIN UR ELPH-MCNC: 2.9 GM/DL
GLUCOSE SERPL-MCNC: 102 MG/DL (ref 65–99)
HCT VFR BLD AUTO: 39.9 % (ref 34–46.6)
HGB BLD-MCNC: 13.2 G/DL (ref 12–15.9)
IMM GRANULOCYTES # BLD AUTO: 0.01 10*3/MM3 (ref 0–0.05)
IMM GRANULOCYTES NFR BLD AUTO: 0.2 % (ref 0–0.5)
LYMPHOCYTES # BLD AUTO: 1.68 10*3/MM3 (ref 0.7–3.1)
LYMPHOCYTES NFR BLD AUTO: 41.6 % (ref 19.6–45.3)
MCH RBC QN AUTO: 31.4 PG (ref 26.6–33)
MCHC RBC AUTO-ENTMCNC: 33.1 G/DL (ref 31.5–35.7)
MCV RBC AUTO: 94.8 FL (ref 79–97)
MONOCYTES # BLD AUTO: 0.38 10*3/MM3 (ref 0.1–0.9)
MONOCYTES NFR BLD AUTO: 9.4 % (ref 5–12)
NEUTROPHILS NFR BLD AUTO: 1.88 10*3/MM3 (ref 1.7–7)
NEUTROPHILS NFR BLD AUTO: 46.6 % (ref 42.7–76)
NRBC BLD AUTO-RTO: 0 /100 WBC (ref 0–0.2)
PLATELET # BLD AUTO: 261 10*3/MM3 (ref 140–450)
PMV BLD AUTO: 11.1 FL (ref 6–12)
POTASSIUM SERPL-SCNC: 3.5 MMOL/L (ref 3.5–5.2)
PROT SERPL-MCNC: 7.7 G/DL (ref 6–8.5)
RBC # BLD AUTO: 4.21 10*6/MM3 (ref 3.77–5.28)
SARS-COV-2 RNA RESP QL NAA+PROBE: NOT DETECTED
SODIUM SERPL-SCNC: 142 MMOL/L (ref 136–145)
WBC NRBC COR # BLD AUTO: 4.04 10*3/MM3 (ref 3.4–10.8)

## 2024-04-27 PROCEDURE — 99283 EMERGENCY DEPT VISIT LOW MDM: CPT

## 2024-04-27 PROCEDURE — 80053 COMPREHEN METABOLIC PANEL: CPT | Performed by: PHYSICIAN ASSISTANT

## 2024-04-27 PROCEDURE — 71046 X-RAY EXAM CHEST 2 VIEWS: CPT

## 2024-04-27 PROCEDURE — 85379 FIBRIN DEGRADATION QUANT: CPT | Performed by: PHYSICIAN ASSISTANT

## 2024-04-27 PROCEDURE — 85025 COMPLETE CBC W/AUTO DIFF WBC: CPT | Performed by: PHYSICIAN ASSISTANT

## 2024-04-27 PROCEDURE — 87636 SARSCOV2 & INF A&B AMP PRB: CPT | Performed by: STUDENT IN AN ORGANIZED HEALTH CARE EDUCATION/TRAINING PROGRAM

## 2024-04-27 RX ORDER — BENZONATATE 200 MG/1
200 CAPSULE ORAL 3 TIMES DAILY PRN
Qty: 30 CAPSULE | Refills: 0 | Status: SHIPPED | OUTPATIENT
Start: 2024-04-27

## 2024-04-27 RX ORDER — SODIUM CHLORIDE 0.9 % (FLUSH) 0.9 %
10 SYRINGE (ML) INJECTION AS NEEDED
Status: DISCONTINUED | OUTPATIENT
Start: 2024-04-27 | End: 2024-04-27 | Stop reason: HOSPADM

## 2024-04-27 NOTE — ED PROVIDER NOTES
Subjective  History of Present Illness:    Chief Complaint:   Chief Complaint   Patient presents with    Cough      History of Present Illness: Juvencio Ramirez is a 34 y.o. female who presents to the emergency department complaining of cough, body aches, congestion and hemoptysis.  Patient states she has been sick for about 3 days, children had the flu recently.  Patient states yesterday began coughing with gross hemoptysis.  Mild shortness of breath.  No chest pain.  She does vape and smoke marijuana.  No history of DVT or PE.  Not on any daily medications, not on any exogenous estrogen.  She has had a hysterectomy.  Onset: 3 to 4 days ago  Duration: Ongoing  Exacerbating / Alleviating factors: None  Associated symptoms: None      Nurses Notes reviewed and agree, including vitals, allergies, social history and prior medical history.     Review of Systems   Constitutional:  Positive for fatigue.   HENT:  Positive for congestion.    Eyes: Negative.    Respiratory:  Positive for cough.         Coughing up blood   Cardiovascular: Negative.  Negative for chest pain.   Gastrointestinal: Negative.    Genitourinary: Negative.    Musculoskeletal: Negative.    Skin: Negative.    Neurological: Negative.    Psychiatric/Behavioral: Negative.         Past Medical History:   Diagnosis Date    COVID-19 vaccine series completed     Hepatitis C     s/p treatment and is negative now.    Herpes     Infectious viral hepatitis     Ovarian cyst     Wears glasses        Allergies:    Patient has no known allergies.      Past Surgical History:   Procedure Laterality Date     SECTION  2012    x3     SECTION       SECTION PRIMARY      DILATION AND CURETTAGE, DIAGNOSTIC / THERAPEUTIC      TOTAL LAPAROSCOPIC HYSTERECTOMY N/A 2023    Procedure: TOTAL LAPAROSCOPIC HYSTERECTOMY AND CYSTOSCOPY;  Surgeon: Chandan Beckett MD;  Location: [facility];  Service: Obstetrics/Gynecology;  Laterality: N/A;  "   TUBAL COAGULATION LAPAROSCOPIC Bilateral 2022    Procedure: TUBAL FALLOPE FILSHIE CLIPPING LAPAROSCOPIC;  Surgeon: Chandan Beckett MD;  Location: [facility];  Service: Obstetrics/Gynecology;  Laterality: Bilateral;    WISDOM TOOTH EXTRACTION           Social History     Socioeconomic History    Marital status: Single   Tobacco Use    Smoking status: Former     Current packs/day: 0.00     Average packs/day: 0.3 packs/day for 10.0 years (2.5 ttl pk-yrs)     Types: Cigarettes     Start date: 2012     Quit date: 2022     Years since quittin.4    Smokeless tobacco: Never   Vaping Use    Vaping status: Every Day    Substances: Nicotine, Flavoring    Devices: Disposable   Substance and Sexual Activity    Alcohol use: Yes     Comment: on occassion    Drug use: Not Currently     Types: Heroin     Comment: hx IV drug use, none in 5 years    Sexual activity: Defer     Birth control/protection: Hysterectomy         Family History   Problem Relation Age of Onset    Diabetes Mother     Thyroid disease Mother        Objective  Physical Exam:  /98 (BP Location: Left arm, Patient Position: Sitting)   Pulse 90   Temp 98.2 °F (36.8 °C) (Oral)   Resp 14   Ht 167.6 cm (66\")   Wt 59 kg (130 lb)   LMP 2023   SpO2 98%   BMI 20.98 kg/m²      Physical Exam  Vitals and nursing note reviewed.   Constitutional:       General: She is not in acute distress.     Appearance: Normal appearance. She is normal weight. She is not ill-appearing, toxic-appearing or diaphoretic.   HENT:      Head: Normocephalic and atraumatic.      Nose: Nose normal.   Eyes:      Extraocular Movements: Extraocular movements intact.   Cardiovascular:      Rate and Rhythm: Normal rate and regular rhythm.   Pulmonary:      Effort: Pulmonary effort is normal. No respiratory distress.      Breath sounds: Normal breath sounds. No stridor. No wheezing, rhonchi or rales.   Chest:      Chest wall: No tenderness.   Abdominal:      " General: Abdomen is flat.   Musculoskeletal:         General: No swelling or tenderness. Normal range of motion.      Cervical back: Normal range of motion.      Right lower leg: No edema.      Left lower leg: No edema.   Skin:     General: Skin is warm and dry.   Neurological:      General: No focal deficit present.      Mental Status: She is alert. Mental status is at baseline.   Psychiatric:         Behavior: Behavior normal.           Procedures    ED Course:    ED Course as of 04/27/24 1425   Sat Apr 27, 2024   1338 Influenza A PCR(!): Detected [TM]   1355 WBC: 4.04 [TM]   1355 Hemoglobin: 13.2 [TM]   1355 Hematocrit: 39.9 [TM]   1419 I have reviewed the mid-level provider(s) note and verbally discussed the case/plan of care.  I was available for consultation as needed at all times during the patient's visit in the Emergency Department.  I agree with the clinical impression, plan, and disposition unless otherwise stated in the MDM below.    ATTENDING ATTESTATION  HPI: 34-year-old female previously healthy who presents with cough, flulike symptoms, and hemoptysis x 3 days.  Vapes daily.    MDM: ED workup reviewed.  CBC, CMP, D-dimer clinically unremarkable.  Influenza A positive.I have independently reviewed and interpreted the chest x-ray 2 view.  My interpretation is negative for pneumonia or infiltrates.       [JS]      ED Course User Index  [JS] Jah Suarez DO  [TM] Benjamin Valencia PA-C       Lab Results (last 24 hours)       Procedure Component Value Units Date/Time    COVID-19 and FLU A/B PCR, 1 HR TAT - Swab, Nasopharynx [[id number]]  (Abnormal) Collected: 04/27/24 1308    Specimen: Swab from Nasopharynx Updated: 04/27/24 1329     COVID19 Not Detected     Influenza A PCR Detected     Influenza B PCR Not Detected    Narrative:      Fact sheet for providers: https://www.fda.gov/media/781734/download    Fact sheet for patients: https://www.fda.gov/media/730790/download    Test performed by  PCR.    CBC & Differential [[id number]]  (Normal) Collected: 04/27/24 1337    Specimen: Blood Updated: 04/27/24 1347    Narrative:      The following orders were created for panel order CBC & Differential.  Procedure                               Abnormality         Status                     ---------                               -----------         ------                     CBC Auto Differential[[id number]]        Normal              Final result                 Please view results for these tests on the individual orders.    Comprehensive Metabolic Panel [[id number]]  (Abnormal) Collected: 04/27/24 1337    Specimen: Blood Updated: 04/27/24 1408     Glucose 102 mg/dL      BUN 10 mg/dL      Creatinine 0.62 mg/dL      Sodium 142 mmol/L      Potassium 3.5 mmol/L      Chloride 102 mmol/L      CO2 26.2 mmol/L      Calcium 9.6 mg/dL      Total Protein 7.7 g/dL      Albumin 4.8 g/dL      ALT (SGPT) 11 U/L      AST (SGOT) 16 U/L      Alkaline Phosphatase 73 U/L      Total Bilirubin 0.3 mg/dL      Globulin 2.9 gm/dL      A/G Ratio 1.7 g/dL      BUN/Creatinine Ratio 16.1     Anion Gap 13.8 mmol/L      eGFR 119.3 mL/min/1.73     Narrative:      GFR Normal >60  Chronic Kidney Disease <60  Kidney Failure <15      D-dimer, Quantitative [[id number]]  (Normal) Collected: 04/27/24 1337    Specimen: Blood Updated: 04/27/24 1413     D-Dimer, Quantitative <0.27 MCGFEU/mL     Narrative:      According to the assay 's published package insert, a normal (<0.50 MCGFEU/mL) D-dimer result in conjunction with a non-high clinical probability assessment, excludes deep vein thrombosis (DVT) and pulmonary embolism (PE) with high sensitivity.    D-dimer values increase with age and this can make VTE exclusion of an older population difficult. To address this, the [employer], based on best available evidence and recent guidelines, recommends that clinicians use age-adjusted D-dimer thresholds in patients greater than  "50 years of age with: a) a low probability of PE who do not meet all Pulmonary Embolism Rule Out Criteria, or b) in those with intermediate probability of PE.   The formula for an age-adjusted D-dimer cut-off is \"age/100\".  For example, a 60 year old patient would have an age-adjusted cut-off of 0.60 MCGFEU/mL and an 80 year old 0.80 MCGFEU/mL.    CBC Auto Differential [[id number]]  (Normal) Collected: 04/27/24 1337    Specimen: Blood Updated: 04/27/24 1347     WBC 4.04 10*3/mm3      RBC 4.21 10*6/mm3      Hemoglobin 13.2 g/dL      Hematocrit 39.9 %      MCV 94.8 fL      MCH 31.4 pg      MCHC 33.1 g/dL      RDW 12.5 %      RDW-SD 43.7 fl      MPV 11.1 fL      Platelets 261 10*3/mm3      Neutrophil % 46.6 %      Lymphocyte % 41.6 %      Monocyte % 9.4 %      Eosinophil % 1.5 %      Basophil % 0.7 %      Immature Grans % 0.2 %      Neutrophils, Absolute 1.88 10*3/mm3      Lymphocytes, Absolute 1.68 10*3/mm3      Monocytes, Absolute 0.38 10*3/mm3      Eosinophils, Absolute 0.06 10*3/mm3      Basophils, Absolute 0.03 10*3/mm3      Immature Grans, Absolute 0.01 10*3/mm3      nRBC 0.0 /100 WBC              XR Chest 2 View    Result Date: 4/27/2024  PROCEDURE: XR CHEST 2 VW-  INDICATION: productive cough  COMPARISON: 11/10/2017  FINDINGS: PA and lateral views of the chest were obtained.   The cardiac and mediastinal silhouettes are within normal limits.    The lungs are clear.  There is no pneumothorax or pleural effusion.  No acute osseous abnormality is identified.      Impression: No radiographic evidence of acute cardiac or pulmonary disease.        This report was signed and finalized on 4/27/2024 2:16 PM by Danya Willoughby MD.                             Medical Decision Making  Amount and/or Complexity of Data Reviewed  Labs: ordered. Decision-making details documented in ED Course.  Radiology: ordered.    Risk  Prescription drug management.              Juvencio Ramirez is a 34 y.o. female who presents to the " emergency department for evaluation of hemoptysis, cough, congestion    Differential diagnosis includes colitis, viral illness, PE, pneumonia among other etiologies.    CBC, CMP, D-dimer, COVID and flu swab, chest x-ray ordered for further evaluation of the patient's presentation.    Chart review if available included outside testing, previous visits, prior labs, prior imaging, available notes from prior evaluations or visits with specialists, medication list, allergies, past medical history, past surgical history when applicable.    Patient was treated with   Medications   sodium chloride 0.9 % flush 10 mL (has no administration in time range)       D-dimer negative, heart rate in the 60s, oxygen 100% on room air at 1424 hrs.  Discussed case with Dr. Suarez, did discuss obtaining CT of the chest with contrast with patient to rule out alveolar hemorrhage however she declined.  I do think this is reasonable given the fact that her vitals are all normal, dimer negative, she states the hemoptysis she was having yesterday is much improved today and its only blood-tinged sputum today.  Did discuss cannot rule out alveolar hemorrhage versus other without CT scan but if her symptoms continue to improve will treat with antitussives, declined Tamiflu, will have her treat symptomatically at home and return for any new or worsening symptoms.    Plan for disposition is discharge.  Patient/family comfortable with and understanding of the plan.      Final diagnoses:   Influenza A          Benjamin Valencia PA-C  04/27/24 9455

## 2024-04-27 NOTE — Clinical Note
[facility] EMERGENCY DEPARTMENT  [address] [address]  Phone: [phone]    Juvencio Ramirez was seen and treated in our emergency department on 4/27/2024.  She may return to work on 04/30/2024.         Thank you for choosing [facility].    Benjamin Valencia PA-C

## 2024-09-03 ENCOUNTER — APPOINTMENT (OUTPATIENT)
Dept: GENERAL RADIOLOGY | Facility: HOSPITAL | Age: 34
End: 2024-09-03
Payer: COMMERCIAL

## 2024-09-03 ENCOUNTER — HOSPITAL ENCOUNTER (EMERGENCY)
Facility: HOSPITAL | Age: 34
Discharge: HOME | End: 2024-09-03
Attending: EMERGENCY MEDICINE
Payer: COMMERCIAL

## 2024-09-03 VITALS
DIASTOLIC BLOOD PRESSURE: 93 MMHG | BODY MASS INDEX: 20.89 KG/M2 | RESPIRATION RATE: 16 BRPM | WEIGHT: 130 LBS | OXYGEN SATURATION: 100 % | HEIGHT: 66 IN | HEART RATE: 65 BPM | SYSTOLIC BLOOD PRESSURE: 135 MMHG | TEMPERATURE: 98.4 F

## 2024-09-03 DIAGNOSIS — R07.9 CHEST PAIN, UNSPECIFIED TYPE: Primary | ICD-10-CM

## 2024-09-03 LAB
ALBUMIN SERPL-MCNC: 4.5 G/DL (ref 3.5–5.2)
ALBUMIN/GLOB SERPL: 1.5 G/DL
ALP SERPL-CCNC: 68 U/L (ref 39–117)
ALT SERPL W P-5'-P-CCNC: 9 U/L (ref 1–33)
ANION GAP SERPL CALCULATED.3IONS-SCNC: 10.4 MMOL/L (ref 5–15)
AST SERPL-CCNC: 13 U/L (ref 1–32)
BASOPHILS # BLD AUTO: 0.04 10*3/MM3 (ref 0–0.2)
BASOPHILS NFR BLD AUTO: 0.8 % (ref 0–1.5)
BILIRUB SERPL-MCNC: 0.7 MG/DL (ref 0–1.2)
BUN SERPL-MCNC: 13 MG/DL (ref 6–20)
BUN/CREAT SERPL: 21 (ref 7–25)
CALCIUM SPEC-SCNC: 9.1 MG/DL (ref 8.6–10.5)
CHLORIDE SERPL-SCNC: 106 MMOL/L (ref 98–107)
CO2 SERPL-SCNC: 22.6 MMOL/L (ref 22–29)
CREAT SERPL-MCNC: 0.62 MG/DL (ref 0.57–1)
DEPRECATED RDW RBC AUTO: 42.1 FL (ref 37–54)
EGFRCR SERPLBLD CKD-EPI 2021: 119.3 ML/MIN/1.73
EOSINOPHIL # BLD AUTO: 0.09 10*3/MM3 (ref 0–0.4)
EOSINOPHIL NFR BLD AUTO: 1.7 % (ref 0.3–6.2)
ERYTHROCYTE [DISTWIDTH] IN BLOOD BY AUTOMATED COUNT: 12 % (ref 12.3–15.4)
GLOBULIN UR ELPH-MCNC: 3 GM/DL
GLUCOSE SERPL-MCNC: 101 MG/DL (ref 65–99)
HCT VFR BLD AUTO: 39.3 % (ref 34–46.6)
HGB BLD-MCNC: 13 G/DL (ref 12–15.9)
HOLD SPECIMEN: NORMAL
HOLD SPECIMEN: NORMAL
IMM GRANULOCYTES # BLD AUTO: 0.01 10*3/MM3 (ref 0–0.05)
IMM GRANULOCYTES NFR BLD AUTO: 0.2 % (ref 0–0.5)
LYMPHOCYTES # BLD AUTO: 1.46 10*3/MM3 (ref 0.7–3.1)
LYMPHOCYTES NFR BLD AUTO: 28 % (ref 19.6–45.3)
MCH RBC QN AUTO: 31.6 PG (ref 26.6–33)
MCHC RBC AUTO-ENTMCNC: 33.1 G/DL (ref 31.5–35.7)
MCV RBC AUTO: 95.6 FL (ref 79–97)
MONOCYTES # BLD AUTO: 0.49 10*3/MM3 (ref 0.1–0.9)
MONOCYTES NFR BLD AUTO: 9.4 % (ref 5–12)
NEUTROPHILS NFR BLD AUTO: 3.12 10*3/MM3 (ref 1.7–7)
NEUTROPHILS NFR BLD AUTO: 59.9 % (ref 42.7–76)
NRBC BLD AUTO-RTO: 0 /100 WBC (ref 0–0.2)
PLATELET # BLD AUTO: 268 10*3/MM3 (ref 140–450)
PMV BLD AUTO: 11 FL (ref 6–12)
POTASSIUM SERPL-SCNC: 4 MMOL/L (ref 3.5–5.2)
PROT SERPL-MCNC: 7.5 G/DL (ref 6–8.5)
RBC # BLD AUTO: 4.11 10*6/MM3 (ref 3.77–5.28)
SODIUM SERPL-SCNC: 139 MMOL/L (ref 136–145)
TROPONIN T SERPL HS-MCNC: <6 NG/L
WBC NRBC COR # BLD AUTO: 5.21 10*3/MM3 (ref 3.4–10.8)
WHOLE BLOOD HOLD COAG: NORMAL
WHOLE BLOOD HOLD SPECIMEN: NORMAL

## 2024-09-03 PROCEDURE — 80053 COMPREHEN METABOLIC PANEL: CPT | Performed by: EMERGENCY MEDICINE

## 2024-09-03 PROCEDURE — 93005 ELECTROCARDIOGRAM TRACING: CPT | Performed by: EMERGENCY MEDICINE

## 2024-09-03 PROCEDURE — 36415 COLL VENOUS BLD VENIPUNCTURE: CPT

## 2024-09-03 PROCEDURE — 25010000002 KETOROLAC TROMETHAMINE PER 15 MG: Performed by: EMERGENCY MEDICINE

## 2024-09-03 PROCEDURE — 85025 COMPLETE CBC W/AUTO DIFF WBC: CPT | Performed by: EMERGENCY MEDICINE

## 2024-09-03 PROCEDURE — 99284 EMERGENCY DEPT VISIT MOD MDM: CPT

## 2024-09-03 PROCEDURE — 71045 X-RAY EXAM CHEST 1 VIEW: CPT

## 2024-09-03 PROCEDURE — 96374 THER/PROPH/DIAG INJ IV PUSH: CPT

## 2024-09-03 PROCEDURE — 84484 ASSAY OF TROPONIN QUANT: CPT | Performed by: EMERGENCY MEDICINE

## 2024-09-03 RX ORDER — SODIUM CHLORIDE 0.9 % (FLUSH) 0.9 %
10 SYRINGE (ML) INJECTION AS NEEDED
Status: DISCONTINUED | OUTPATIENT
Start: 2024-09-03 | End: 2024-09-03 | Stop reason: HOSPADM

## 2024-09-03 RX ORDER — HYDROXYZINE HYDROCHLORIDE 10 MG/1
10 TABLET, FILM COATED ORAL 3 TIMES DAILY PRN
Qty: 10 TABLET | Refills: 0 | Status: SHIPPED | OUTPATIENT
Start: 2024-09-03

## 2024-09-03 RX ORDER — KETOROLAC TROMETHAMINE 30 MG/ML
30 INJECTION, SOLUTION INTRAMUSCULAR; INTRAVENOUS ONCE
Status: COMPLETED | OUTPATIENT
Start: 2024-09-03 | End: 2024-09-03

## 2024-09-03 RX ADMIN — KETOROLAC TROMETHAMINE 30 MG: 30 INJECTION, SOLUTION INTRAMUSCULAR; INTRAVENOUS at 07:25

## 2024-09-03 NOTE — ED PROVIDER NOTES
[facility] EMERGENCY DEPARTMENT  Emergency Department Encounter  Emergency Medicine Physician Note     Pt Name:Juvencio Ramirez  MRN: [id number]  Birthdate 1990  Date of evaluation: 9/3/2024  PCP:  Rhoda Silverman APRN  Note Started: 6:53 AM EDT      CHIEF COMPLAINT       Chief Complaint   Patient presents with    Chest Pain       HISTORY OF PRESENT ILLNESS  (Location/Symptom, Timing/Onset, Context/Setting, Quality, Duration, Modifying Factors, Severity.)      Juvencio Ramirez is a 34 y.o. female who presents with right-sided chest pain, she also describes a lump that she has noticed over the last day.  Patient states its tender to touch.  Patient does state that it also worsens with deep inspiration.  Patient denies any nausea, vomiting, lightheadedness dizziness or diaphoresis.    PAST MEDICAL / SURGICAL / SOCIAL / FAMILY HISTORY     Past Medical History:   Diagnosis Date    COVID-19 vaccine series completed     Hepatitis C     s/p treatment and is negative now.    Herpes     Infectious viral hepatitis     Ovarian cyst     Wears glasses      No additional pertinent       Past Surgical History:   Procedure Laterality Date     SECTION  2012    x3     SECTION       SECTION PRIMARY      DILATION AND CURETTAGE, DIAGNOSTIC / THERAPEUTIC      TOTAL LAPAROSCOPIC HYSTERECTOMY N/A 2023    Procedure: TOTAL LAPAROSCOPIC HYSTERECTOMY AND CYSTOSCOPY;  Surgeon: Chandan Beckett MD;  Location: [facility];  Service: Obstetrics/Gynecology;  Laterality: N/A;    TUBAL COAGULATION LAPAROSCOPIC Bilateral 2022    Procedure: TUBAL FALLOPE FILSHIE CLIPPING LAPAROSCOPIC;  Surgeon: Chandan Beckett MD;  Location: [facility];  Service: Obstetrics/Gynecology;  Laterality: Bilateral;    WISDOM TOOTH EXTRACTION       No additional pertinent       Social History     Socioeconomic History    Marital status: Single   Tobacco Use    Smoking status: Former  "    Current packs/day: 0.00     Average packs/day: 0.3 packs/day for 10.0 years (2.5 ttl pk-yrs)     Types: Cigarettes     Start date: 2012     Quit date: 2022     Years since quittin.8    Smokeless tobacco: Never   Vaping Use    Vaping status: Every Day    Substances: Nicotine, Flavoring    Devices: Disposable   Substance and Sexual Activity    Alcohol use: Yes     Comment: on occassion    Drug use: Not Currently     Types: Heroin     Comment: hx IV drug use, none in 5 years    Sexual activity: Defer     Birth control/protection: Hysterectomy       Family History   Problem Relation Age of Onset    Diabetes Mother     Thyroid disease Mother        Allergies:  Patient has no known allergies.    Home Medications:  Prior to Admission medications    Medication Sig Start Date End Date Taking? Authorizing Provider   benzonatate (TESSALON) 200 MG capsule Take 1 capsule by mouth 3 (Three) Times a Day As Needed for Cough. 24   Benjamin Valencia PA-C   hydrocortisone (ANUSOL-HC) 25 MG suppository Insert 1 suppository into the rectum 2 (Two) Times a Day. 10/14/23   Cherelle Fisher PA-C   Ventolin  (90 Base) MCG/ACT inhaler INHALE 2 PUFFS BY MOUTH FOUR TIMES A DAY AS NEEDED 10/6/23   Provider, MD Odell         REVIEW OF SYSTEMS       Review of Systems   Constitutional:  Negative for diaphoresis and fever.   Respiratory:  Negative for chest tightness and shortness of breath.    Cardiovascular:  Positive for chest pain.   Gastrointestinal:  Negative for abdominal pain, nausea and vomiting.   Endocrine: Negative for polyuria.   Genitourinary:  Negative for difficulty urinating and frequency.       PHYSICAL EXAM      INITIAL VITALS:   /93   Pulse 65   Temp 98.4 °F (36.9 °C) (Oral)   Resp 16   Ht 167.6 cm (66\")   Wt 59 kg (130 lb)   LMP 2023   SpO2 100%   BMI 20.98 kg/m²     Physical Exam  Constitutional:       Appearance: Normal appearance.   HENT:      Head: " Normocephalic and atraumatic.      Mouth/Throat:      Mouth: Mucous membranes are moist.      Pharynx: Oropharynx is clear.   Cardiovascular:      Rate and Rhythm: Normal rate and regular rhythm.      Pulses: Normal pulses.   Pulmonary:      Effort: Pulmonary effort is normal.      Breath sounds: Normal breath sounds.   Chest:      Chest wall: Tenderness present. No deformity or crepitus.   Abdominal:      General: Abdomen is flat. There is no distension.      Palpations: Abdomen is soft.      Tenderness: There is no abdominal tenderness. There is no guarding.   Musculoskeletal:         General: Normal range of motion.   Skin:     General: Skin is warm and dry.   Neurological:      General: No focal deficit present.      Mental Status: She is alert and oriented to person, place, and time.   Psychiatric:         Mood and Affect: Mood normal.         Behavior: Behavior normal.           DDX/DIAGNOSTIC RESULTS / EMERGENCY DEPARTMENT COURSE / MDM     Differential Diagnosis included but not limited: Costochondritis, CT imaging displaced rib, pleurisy, pneumonia    Diagnoses Considered but Do Not Suspect: ACS, PE, traumatic injury including hemopneumothorax, rib fracture    Decision Rules/Scores utilized: N/A     Tests considered but not ordered and why:  N/A     MIPS: N/A     Code Status Discussion:  Not Discussed    Additional Patient Education Provided: None     Medical Decision Making    Medical Decision Making  Patient presenting with chest pain, exam demonstrates no concerns for volume overload and low concern heart failure.  EKG demonstrated no signs of active ischemia.  Troponin was obtained and noted to be negative, due to timing delta troponin was not indicated.  Patient's presentation not consistent with pulmonary embolism as she is PERC negative.  X-ray demonstrated no acute cardiopulmonary processes, low concern for traumatic injury including pneumothorax.  Patient had no infectious symptoms, x-ray noted to  be negative, low concern for pneumonia.  Patient with a heart score 3 or less, do feel patient is stable for discharge home for further follow-up by primary care doctor.  Patient given discharge instructions to return for worsening chest pain, lightheadedness, dizziness, feeling sweaty, shortness of breath, or any other concerns.  Patient discharged home in stable condition.     Problems Addressed:  Chest pain, unspecified type: complicated acute illness or injury    Amount and/or Complexity of Data Reviewed  Labs: ordered.  Radiology: ordered.  ECG/medicine tests: ordered.    Risk  Prescription drug management.        See ED COURSE for additional MDM statements    EKG    EKG Interpretation    Evaluated and interpreted by emergency department physician    Rhythm: Normal Sinus Rhythm  Rate: Normal  Axis: Michle[place]  Ectopy: none  Conduction: Normal  ST Segments: Normal  T Waves: Normal  Q Waves: None    Clinical Impression: Normal Sinus Rhythm    Gerald Jj DO      All EKG's are interpreted by the Emergency Department Physician who either signs or Co-signs this chart in the absence of a cardiologist.    Additional Scores      HEART Score: 1              EMERGENCY DEPARTMENT COURSE:    ED Course as of 09/04/24 0557   Tue Sep 03, 2024   0813 Personally evaluated chest x-ray, no signs of pneumonia, no acute cardiopulmonary processes noted. [CR]      ED Course User Index  [CR] Gerald Jj DO       PROCEDURES:  None Performed   Procedures    DATA FOR LAB AND RADIOLOGY TESTS ORDERED BELOW ARE REVIEWED BY THE ED CLINICIAN:    RADIOLOGY: All x-rays, CT, MRI, and formal ultrasound images (except ED bedside ultrasound) are read by the radiologist, see reports below, unless otherwise noted in MDM or here.  Reports below are reviewed by myself.  XR Chest 1 View   Final Result   No acute cardiopulmonary process.                       This report was signed and finalized on 9/3/2024 8:13 AM by Cherelle Bee    MD.              LABS: Lab orders shown below, the results are reviewed by myself, and all abnormals are listed below.  Labs Reviewed   COMPREHENSIVE METABOLIC PANEL - Abnormal; Notable for the following components:       Result Value    Glucose 101 (*)     All other components within normal limits    Narrative:     GFR Normal >60  Chronic Kidney Disease <60  Kidney Failure <15     CBC WITH AUTO DIFFERENTIAL - Abnormal; Notable for the following components:    RDW 12.0 (*)     All other components within normal limits   TROPONIN - Normal    Narrative:     High Sensitive Troponin T Reference Range:  <14.0 ng/L- Negative Female for AMI  <22.0 ng/L- Negative Male for AMI  >=14 - Abnormal Female indicating possible myocardial injury.  >=22 - Abnormal Male indicating possible myocardial injury.   Clinicians would have to utilize clinical acumen, EKG, Troponin, and serial changes to determine if it is an Acute Myocardial Infarction or myocardial injury due to an underlying chronic condition.        RAINBOW DRAW    Narrative:     The following orders were created for panel order [place] Draw.  Procedure                               Abnormality         Status                     ---------                               -----------         ------                     Green Top (Gel)[[id number]]                                  Final result               Lavender Top[[id number]]                                     Final result               Gold Top - SST[[id number]]                                   Final result               Light Blue Top[[id number]]                                   Final result                 Please view results for these tests on the individual orders.   CBC AND DIFFERENTIAL    Narrative:     The following orders were created for panel order CBC & Differential.  Procedure                               Abnormality         Status                     ---------                               -----------          ------                     CBC Auto Differential[[id number]]        Abnormal            Final result                 Please view results for these tests on the individual orders.   GREEN TOP   LAVENDER TOP   GOLD TOP - SST   LIGHT BLUE TOP       Vitals Reviewed:    Vitals:    09/03/24 0700 09/03/24 0730 09/03/24 0800 09/03/24 0900   BP: 121/88 120/80 127/80 135/93   BP Location:       Patient Position:       Pulse: 60 57 70 65   Resp:       Temp:    98.4 °F (36.9 °C)   TempSrc:    Oral   SpO2: 100% 100% 99% 100%   Weight:       Height:           MEDICATIONS GIVEN TO PATIENT THIS ENCOUNTER:  Medications   ketorolac (TORADOL) injection 30 mg (30 mg Intravenous Given 9/3/24 0725)       CONSULTS:  None    CRITICAL CARE:  There was significant risk of life threatening deterioration of patient's condition requiring my direct management. Critical care time 0 minutes, excluding any documented procedures.    FINAL IMPRESSION      1. Chest pain, unspecified type          DISPOSITION / PLAN     ED Disposition       ED Disposition   Discharge    Condition   Stable    Comment   --               PATIENT REFERRED TO:  Rhoda Silverman, APRN  [address]  [address]  [place] [address] [phone]    In 1 week        DISCHARGE MEDICATIONS:     Medication List        START taking these medications      hydrOXYzine 10 MG tablet  Commonly known as: ATARAX  Take 1 tablet by mouth 3 (Three) Times a Day As Needed for Anxiety.            CONTINUE taking these medications      benzonatate 200 MG capsule  Commonly known as: TESSALON  Take 1 capsule by mouth 3 (Three) Times a Day As Needed for Cough.     hydrocortisone 25 MG suppository  Commonly known as: ANUSOL-HC  Insert 1 suppository into the rectum 2 (Two) Times a Day.     Ventolin  (90 Base) MCG/ACT inhaler  Generic drug: albuterol sulfate HFA               Where to Get Your Medications        These medications were sent to [facility]/pharmacy #[id number] - [place], [place] - [address] - [phone]  - [phone] FX  [address] [address]      Phone: [phone]   hydrOXYzine 10 MG tablet         Electronically signed by Gerald Jj DO, 09/03/24, 6:53 AM EDT.    Emergency Medicine Physician  [facility]  (Please note that portions of thisnote were completed with a voice recognition program.  Efforts were made to edit the dictations but occasionally words are mis-transcribed.)         Gerald Jj DO  09/04/24 0557

## 2024-09-03 NOTE — DISCHARGE INSTRUCTIONS
If you notice any concerning symptoms, please return to the ER immediately. These can include but are not limited to: worsening of you condition, fevers, chills, shortness of breath, vomiting, weakness of the extremities, changes in your mental status, numbness, pale extremities, or chest pain.     Take medications as prescribed, your pharmacist may have additional recommendations concerning these medications.    For pain use ibuprofen (Motrin) or acetaminophen (Tylenol), unless prescribed medications that also contain these medications.  You can take over the counter acetaminophen or ibuprofen, please follow the directions as dosages differ. Do not take ibuprofen if you have a history of peptic ulcers, kidney disease, bariatric surgery, or are currently pregnant.  Do not take Tylenol if you have a history of liver disease or alcohol use disorder.        THANK YOU!!! From [facility] Emergency Department    On behalf of the Emergency Department staff at [facility], I would like to thank you for giving us the opportunity to address your health care needs and concerns. We hope that during your visit, our service was delivered in a professional and caring manner. Please keep [facility] in mind as we walk with you down the path to your own personal wellness. Please expect follow-up phone calls concerning additional care and questions about your experience.      You have received additional information specific to your diagnosis in these discharge instructions, please read these fully.  Anytime you have been seen in the emergency department we recommend close follow up with your primary care provider or specialist, please follow these directions as indicated.

## (undated) DEVICE — ENSEAL X1 TISSUE SEALER, CURVED JAW, 37 CM SHAFT LENGTH: Brand: ENSEAL

## (undated) DEVICE — RICH LAVH: Brand: MEDLINE INDUSTRIES, INC.

## (undated) DEVICE — PAD STEEP TRENDELENBURG W/RAIL STRAP INTEGR ARM PROTECT WING

## (undated) DEVICE — SOL IRR H2O BTL 1000ML STRL

## (undated) DEVICE — HARMONIC 1100 SHEARS, 36CM SHAFT LENGTH: Brand: HARMONIC

## (undated) DEVICE — TBG PENCL TELESCP MEGADYNE SMOKE EVAC 10FT

## (undated) DEVICE — MARKR SKIN W/RULR

## (undated) DEVICE — DRP ADAPT ALLY UTER POSTN SYS 1P/U

## (undated) DEVICE — PREMIUM WET SKIN PREP TRAY CHG: Brand: MEDLINE INDUSTRIES, INC.

## (undated) DEVICE — SYR LUERLOK 50ML

## (undated) DEVICE — MANIP UTER RUMI TP 6.7MM 10CM GRN

## (undated) DEVICE — SPNG GZ WOVN 4X4IN 12PLY 10/BX STRL

## (undated) DEVICE — ENDOPATH XCEL BLADELESS TROCARS WITH STABILITY SLEEVES: Brand: ENDOPATH XCEL

## (undated) DEVICE — GLV SURG SENSICARE W/ALOE PF LF 7.5 STRL

## (undated) DEVICE — SUTURING DEVICE: Brand: ENDO STITCH

## (undated) DEVICE — SOL NACL 0.9PCT 1000ML

## (undated) DEVICE — GLV SURG BIOGEL M LTX PF 8

## (undated) DEVICE — SLV SCD CALF HEMOFORCE DVT THERP REPROC MD

## (undated) DEVICE — ST IRR CYSTO W/SPK 77IN LF

## (undated) DEVICE — CATHETER,URETHRAL,REDRUBBER,STRL,16FR: Brand: MEDLINE

## (undated) DEVICE — OPN ALLPURP 5/IN/1

## (undated) DEVICE — HDRST POSTN SLOTTED A/

## (undated) DEVICE — JELLY,LUBE,STERILE,FLIP TOP,TUBE,2-OZ: Brand: MEDLINE

## (undated) DEVICE — 4-PORT MANIFOLD: Brand: NEPTUNE 2

## (undated) DEVICE — SUT ETHLN 4/0 PS2 PLSTC 1667G

## (undated) DEVICE — DISPOSABLE MONOPOLAR ENDOSCOPIC CORD 10 FT. (3M): Brand: KIRWAN

## (undated) DEVICE — 2, DISPOSABLE SUCTION/IRRIGATOR WITHOUT DISPOSABLE TIP: Brand: STRYKEFLOW

## (undated) DEVICE — SUT VIC 0 CT 36IN J958H

## (undated) DEVICE — MANIP UTER ARCH KOHEFFICIENT 3.0CM

## (undated) DEVICE — PATIENT RETURN ELECTRODE, SINGLE-USE, CONTACT QUALITY MONITORING, ADULT, WITH 9FT CORD, FOR PATIENTS WEIGING OVER 33LBS. (15KG): Brand: MEGADYNE